# Patient Record
Sex: FEMALE | Race: WHITE | NOT HISPANIC OR LATINO
[De-identification: names, ages, dates, MRNs, and addresses within clinical notes are randomized per-mention and may not be internally consistent; named-entity substitution may affect disease eponyms.]

---

## 2017-10-18 ENCOUNTER — APPOINTMENT (OUTPATIENT)
Dept: BARIATRICS | Facility: CLINIC | Age: 32
End: 2017-10-18
Payer: MEDICAID

## 2017-10-18 ENCOUNTER — OUTPATIENT (OUTPATIENT)
Dept: OUTPATIENT SERVICES | Facility: HOSPITAL | Age: 32
LOS: 1 days | End: 2017-10-18
Payer: MEDICAID

## 2017-10-18 VITALS
SYSTOLIC BLOOD PRESSURE: 116 MMHG | BODY MASS INDEX: 48.89 KG/M2 | DIASTOLIC BLOOD PRESSURE: 82 MMHG | WEIGHT: 272.49 LBS | HEIGHT: 62.5 IN

## 2017-10-18 DIAGNOSIS — Z98.84 BARIATRIC SURGERY STATUS: ICD-10-CM

## 2017-10-18 PROCEDURE — S2083 ADJUSTMENT GASTRIC BAND: CPT

## 2017-10-18 PROCEDURE — 74220 X-RAY XM ESOPHAGUS 1CNTRST: CPT

## 2017-10-18 PROCEDURE — 99214 OFFICE O/P EST MOD 30 MIN: CPT | Mod: 25

## 2017-10-18 PROCEDURE — 74220 X-RAY XM ESOPHAGUS 1CNTRST: CPT | Mod: 26

## 2017-10-18 RX ORDER — CETIRIZINE HYDROCHLORIDE 10 MG/1
10 TABLET, FILM COATED ORAL
Refills: 0 | Status: ACTIVE | COMMUNITY

## 2017-12-01 ENCOUNTER — APPOINTMENT (OUTPATIENT)
Dept: BARIATRICS/WEIGHT MGMT | Facility: CLINIC | Age: 32
End: 2017-12-01

## 2018-03-23 ENCOUNTER — RESULT REVIEW (OUTPATIENT)
Age: 33
End: 2018-03-23

## 2019-01-31 ENCOUNTER — APPOINTMENT (OUTPATIENT)
Dept: BARIATRICS | Facility: CLINIC | Age: 34
End: 2019-01-31
Payer: MEDICAID

## 2019-01-31 ENCOUNTER — OUTPATIENT (OUTPATIENT)
Dept: OUTPATIENT SERVICES | Facility: HOSPITAL | Age: 34
LOS: 1 days | End: 2019-01-31
Payer: MEDICAID

## 2019-01-31 VITALS
HEART RATE: 83 BPM | OXYGEN SATURATION: 98 % | BODY MASS INDEX: 41.53 KG/M2 | WEIGHT: 231.48 LBS | DIASTOLIC BLOOD PRESSURE: 80 MMHG | HEIGHT: 62.5 IN | SYSTOLIC BLOOD PRESSURE: 122 MMHG

## 2019-01-31 VITALS — TEMPERATURE: 97.8 F

## 2019-01-31 DIAGNOSIS — K21.9 GASTRO-ESOPHAGEAL REFLUX DISEASE W/OUT ESOPHAGITIS: ICD-10-CM

## 2019-01-31 DIAGNOSIS — E66.01 MORBID (SEVERE) OBESITY DUE TO EXCESS CALORIES: ICD-10-CM

## 2019-01-31 DIAGNOSIS — Z98.84 BARIATRIC SURGERY STATUS: ICD-10-CM

## 2019-01-31 PROCEDURE — 74220 X-RAY XM ESOPHAGUS 1CNTRST: CPT | Mod: 26

## 2019-01-31 PROCEDURE — 74220 X-RAY XM ESOPHAGUS 1CNTRST: CPT

## 2019-01-31 PROCEDURE — 99214 OFFICE O/P EST MOD 30 MIN: CPT

## 2019-01-31 NOTE — REASON FOR VISIT
[Follow-Up Visit] : a follow-up visit for [S/P Bariatric Surgery] : s/p bariatric surgery [Band Adjustment] : band adjustment

## 2019-02-04 NOTE — PHYSICAL EXAM
[Obese, well nourished, in no acute distress] : obese, well nourished, in no acute distress [Normal] : affect appropriate [de-identified] : normoactive bowel sounds, soft and non tender, no hepatosplenomegaly or masses appreciated. + tenderness /swelling / erythema / clear discharge along umbilicus. [de-identified] : + erythema / swelling- jaden umbilical region.

## 2019-02-04 NOTE — ASSESSMENT
[FreeTextEntry1] : 33 year old F s/p lap band surgery APS- here for follow up visit. Weight loss  since last visit.Currently lap band is empty. Patient reports with complaints of pain / swelling along umbilicus -- on an antibiotic regimen due to history of recurrent cellulitis.Currently lap band is empty. Cellulitis - umbilicus. Pt is afebrile. Denies chills. \par \par VE PERFORMED TODAY- NO OBSTRUCTION. NO PROLAPSE. MILD POUCH DILATATION.\par \par No Lap Band adjustment performed today.\par \par Nutritional counseling has been provided. The patient is encouraged to remain calorie conscious and continue a low fat, low carbohydrate, protein focus diet. Pt encouraged to participate in a daily exercise regimen incorporating cardio and  strength training. Encouraged to keep a food journal.\par \par \par Dr. Anne saw patient. Discussed and reviewed risks and benefits of removal of lap band and port. \par \par \par Plan to schedule surgery for removal of lap band and port secondary to reflux symptoms and occasional stuck episodes. \par Wound culture sent to pharmacy for culture and sensitivity. \par \par Spoke to Dr. Noe - infectious disease - recommended pt continue doxycycline 100 mg bid x 10 days until Culture comes back within 72 hours. \par \par Plan to be referred to obesity medicine specialist within next 1-2 months. \par \par Return to office  if any concerns or worsening of symptoms.  \par

## 2019-02-04 NOTE — HISTORY OF PRESENT ILLNESS
[Procedure: ___] : Procedure performed: [unfilled]  [Date of Surgery: ___] : Date of Surgery:   [unfilled] [Surgeon Name:   ___] : Surgeon Name: Dr. SKINNER [Pre-Op Weight ___] : Pre-op weight was [unfilled] lbs [de-identified] : 33 year old F s/p lap band surgery APS- here for follow up visit. Weight loss  since last visit.Currently lap band is empty. Patient reports with complaints of pain / swelling along umbilicus -- on an antibiotic regimen due to history of recurrent cellulitis. ( doxycycline 100 mg po bid x 10 days).Currently following weight watchers program.Pt states she is is consuming consistent protein focus meals and consuming a sufficient amount of zero calorie liquid per day.No complaints of nausea or vomiting. Pt with complaints of mild to moderate reflux symptoms. No change in BM. Denies port site pain. Exercising regularly incorporating cardio and strength training. Swab sent to lab for culture and sensitivity.\par Recent CT scan abdomen - 2018 Normal denies abscess - plan to forward results.

## 2019-02-04 NOTE — REVIEW OF SYSTEMS
[Recent Change In Weight] : ~T recent weight change [Dysphagia] : dysphagia [Abdominal Pain] : abdominal pain [Reflux/Heartburn] : reflux/heartburn [Negative] : Endocrine [Fever] : no fever [Leg Claudication] : no intermittent leg claudication [Lower Ext Edema] : no lower extremity edema [Cough] : no cough [SOB on Exertion] : no shortness of breath during exertion [Vomiting] : no vomiting [Constipation] : no constipation [Diarrhea] : no diarrhea [FreeTextEntry2] : weight loss  [FreeTextEntry4] : occasional with solid foods.  [FreeTextEntry7] : jaden umbilical pain discomfort   - occasional port site pain. daytime/ nighttime reflux symptoms.

## 2019-02-08 LAB — BACTERIA SPEC CULT: ABNORMAL

## 2019-03-14 ENCOUNTER — OUTPATIENT (OUTPATIENT)
Dept: OUTPATIENT SERVICES | Facility: HOSPITAL | Age: 34
LOS: 1 days | End: 2019-03-14
Payer: MEDICAID

## 2019-03-14 VITALS
DIASTOLIC BLOOD PRESSURE: 85 MMHG | HEIGHT: 63 IN | OXYGEN SATURATION: 98 % | RESPIRATION RATE: 14 BRPM | SYSTOLIC BLOOD PRESSURE: 131 MMHG | WEIGHT: 104.5 LBS | TEMPERATURE: 98 F | HEART RATE: 74 BPM

## 2019-03-14 DIAGNOSIS — K21.9 GASTRO-ESOPHAGEAL REFLUX DISEASE WITHOUT ESOPHAGITIS: ICD-10-CM

## 2019-03-14 DIAGNOSIS — Z98.84 BARIATRIC SURGERY STATUS: Chronic | ICD-10-CM

## 2019-03-14 DIAGNOSIS — R13.10 DYSPHAGIA, UNSPECIFIED: ICD-10-CM

## 2019-03-14 DIAGNOSIS — K95.09 OTHER COMPLICATIONS OF GASTRIC BAND PROCEDURE: ICD-10-CM

## 2019-03-14 DIAGNOSIS — Z01.818 ENCOUNTER FOR OTHER PREPROCEDURAL EXAMINATION: ICD-10-CM

## 2019-03-14 DIAGNOSIS — Z98.84 BARIATRIC SURGERY STATUS: ICD-10-CM

## 2019-03-14 LAB
ANION GAP SERPL CALC-SCNC: 5 MMOL/L — SIGNIFICANT CHANGE UP (ref 5–17)
BLD GP AB SCN SERPL QL: SIGNIFICANT CHANGE UP
BUN SERPL-MCNC: 14 MG/DL — SIGNIFICANT CHANGE UP (ref 7–23)
CALCIUM SERPL-MCNC: 9.2 MG/DL — SIGNIFICANT CHANGE UP (ref 8.4–10.5)
CHLORIDE SERPL-SCNC: 102 MMOL/L — SIGNIFICANT CHANGE UP (ref 96–108)
CO2 SERPL-SCNC: 29 MMOL/L — SIGNIFICANT CHANGE UP (ref 22–31)
CREAT SERPL-MCNC: 0.67 MG/DL — SIGNIFICANT CHANGE UP (ref 0.5–1.3)
GLUCOSE SERPL-MCNC: 91 MG/DL — SIGNIFICANT CHANGE UP (ref 70–99)
HCT VFR BLD CALC: 39 % — SIGNIFICANT CHANGE UP (ref 34.5–45)
HGB BLD-MCNC: 12.7 G/DL — SIGNIFICANT CHANGE UP (ref 11.5–15.5)
MCHC RBC-ENTMCNC: 27.9 PG — SIGNIFICANT CHANGE UP (ref 27–34)
MCHC RBC-ENTMCNC: 32.6 GM/DL — SIGNIFICANT CHANGE UP (ref 32–36)
MCV RBC AUTO: 85.5 FL — SIGNIFICANT CHANGE UP (ref 80–100)
NRBC # BLD: 0 /100 WBCS — SIGNIFICANT CHANGE UP (ref 0–0)
PLATELET # BLD AUTO: 212 K/UL — SIGNIFICANT CHANGE UP (ref 150–400)
POTASSIUM SERPL-MCNC: 4.6 MMOL/L — SIGNIFICANT CHANGE UP (ref 3.5–5.3)
POTASSIUM SERPL-SCNC: 4.6 MMOL/L — SIGNIFICANT CHANGE UP (ref 3.5–5.3)
RBC # BLD: 4.56 M/UL — SIGNIFICANT CHANGE UP (ref 3.8–5.2)
RBC # FLD: 12.9 % — SIGNIFICANT CHANGE UP (ref 10.3–14.5)
SODIUM SERPL-SCNC: 136 MMOL/L — SIGNIFICANT CHANGE UP (ref 135–145)
WBC # BLD: 7.02 K/UL — SIGNIFICANT CHANGE UP (ref 3.8–10.5)
WBC # FLD AUTO: 7.02 K/UL — SIGNIFICANT CHANGE UP (ref 3.8–10.5)

## 2019-03-14 NOTE — H&P PST ADULT - NSICDXPASTSURGICALHX_GEN_ALL_CORE_FT
PAST SURGICAL HISTORY:  H/O laparoscopic adjustable gastric banding 2012    History of wisdom tooth extraction 2008

## 2019-03-14 NOTE — H&P PST ADULT - NSICDXPROBLEM_GEN_ALL_CORE_FT
PROBLEM DIAGNOSES  Problem: Gastric band malfunction  Assessment and Plan: laproscopic removal of band   medical clearance  pre op instruction  will repeat UCG on 4/8/19. pt agreed to come to [facility] for pregnency test

## 2019-03-14 NOTE — H&P PST ADULT - NSICDXFAMILYHX_GEN_ALL_CORE_FT
FAMILY HISTORY:  Family history of ovarian cancer, mother  Family history of prostate cancer in father

## 2019-03-14 NOTE — H&P PST ADULT - NSICDXPASTMEDICALHX_GEN_ALL_CORE_FT
PAST MEDICAL HISTORY:  Closed fracture of wrist left wrist at age 6    Fracture of clavicle at age 4.    No Past Medical History

## 2019-03-14 NOTE — H&P PST ADULT - HISTORY OF PRESENT ILLNESS
33year old female who had undergone lap band placement on 2012 presents with c/o lap band port site pain, vomiting and multiple episodes of food stuck . Schedule for lap band removal.

## 2019-04-04 NOTE — ASU DISCHARGE PLAN (ADULT/PEDIATRIC) - CARE PROVIDER_API CALL
Tasia Anne (MD)  Surgery  [address] [place] [address]  Phone: [phone]  Fax: [phone]  Follow Up Time: 1 week

## 2019-04-04 NOTE — ASU DISCHARGE PLAN (ADULT/PEDIATRIC) - SPECIFY DIET AND FLUID
advance as tolerated SLOWLY from clear liquids to full fluids to small portions of low-calorie foods. Generally you will stay on each level for 1-2 days. If you experience nausea/vomiting go back to the previous level you tolerated for another 24-48 hours , then try advancing again.

## 2019-04-04 NOTE — ASU DISCHARGE PLAN (ADULT/PEDIATRIC) - ASU DC SPECIAL INSTRUCTIONSFT
Ice packs to abdomen and shoulder  Pain meds as per MD  Take an over the counter stool softener like Colace to avoid constipation while taking a narcotic for pain Call Dr. Anne's office at [phone] to make an appointment to be seen within 7- 10 days  Ice packs to abdomen and shoulder  Pain meds as per MD  Take an over the counter stool softener like Colace to avoid constipation while taking a narcotic for pain

## 2019-04-04 NOTE — ASU DISCHARGE PLAN (ADULT/PEDIATRIC) - CALL YOUR DOCTOR IF YOU HAVE ANY OF THE FOLLOWING:
Pain not relieved by Medications/Bleeding that does not stop/Swelling that gets worse/Fever greater than (need to indicate Fahrenheit or Celsius)

## 2019-04-04 NOTE — ASU DISCHARGE PLAN (ADULT/PEDIATRIC) - FOLLOW UP APPOINTMENTS
911 or go to the nearest Emergency Room/[facility]

## 2019-04-08 ENCOUNTER — APPOINTMENT (OUTPATIENT)
Dept: SURGERY | Facility: CLINIC | Age: 34
End: 2019-04-08
Payer: MEDICAID

## 2019-04-08 ENCOUNTER — TRANSCRIPTION ENCOUNTER (OUTPATIENT)
Age: 34
End: 2019-04-08

## 2019-04-08 VITALS
HEART RATE: 75 BPM | DIASTOLIC BLOOD PRESSURE: 70 MMHG | WEIGHT: 223.1 LBS | BODY MASS INDEX: 40.03 KG/M2 | OXYGEN SATURATION: 98 % | HEIGHT: 62.5 IN | SYSTOLIC BLOOD PRESSURE: 110 MMHG

## 2019-04-08 LAB — HCG UR QL: NEGATIVE — SIGNIFICANT CHANGE UP

## 2019-04-08 PROCEDURE — G0463: CPT

## 2019-04-08 PROCEDURE — 81025 URINE PREGNANCY TEST: CPT

## 2019-04-08 PROCEDURE — 86900 BLOOD TYPING SEROLOGIC ABO: CPT

## 2019-04-08 PROCEDURE — 99214 OFFICE O/P EST MOD 30 MIN: CPT | Mod: 24

## 2019-04-08 PROCEDURE — 86850 RBC ANTIBODY SCREEN: CPT

## 2019-04-08 PROCEDURE — 36415 COLL VENOUS BLD VENIPUNCTURE: CPT

## 2019-04-08 PROCEDURE — 80048 BASIC METABOLIC PNL TOTAL CA: CPT

## 2019-04-08 PROCEDURE — 86901 BLOOD TYPING SEROLOGIC RH(D): CPT

## 2019-04-08 PROCEDURE — 85027 COMPLETE CBC AUTOMATED: CPT

## 2019-04-08 NOTE — PLAN
[FreeTextEntry1] : Recurrent omphalitis or umbilical cellulitis for ~1+ years.\par Fortunately, by history, no obviously associated systemic or GI or  complaints to suggest fistulas or intra-abdominal processes.\par Previous CT scan now dated, but generally reassuring.\par Careful exam today reveals large volume of IMPACTED hair DEEP inside umbilicus.  Successfully extracted and tolerated well.\par \par As such, patient cleared for Lap Band removal from General Surgery perspective.\par Plan to wash site with warm soap and water daily.\par H2O2 rinse to follow.\par Will follow-up with me after Lap Band removal.\par This should be amenable to conservative management and local care.\par However, should issues persist, then interval imaging seems appropriate prior to any local exploration for more definitive care.\par \par Patient and mother pleased.  They leave office in good spirits and verbalize plan as outlined above.

## 2019-04-08 NOTE — REASON FOR VISIT
[Consultation] : a consultation visit [Parent] : parent [FreeTextEntry1] : Recurrent umbilical cellulitis.

## 2019-04-08 NOTE — PHYSICAL EXAM
[Respiratory Effort] : normal respiratory effort [Normal Rate and Rhythm] : normal rate and rhythm [Abdominal Masses] : No abdominal masses [No HSM] : no hepatosplenomegaly [Tender] : was nontender [Enlarged] : not enlarged [Alert] : alert [Oriented to Person] : oriented to person [Oriented to Place] : oriented to place [Oriented to Time] : oriented to time [Anxious] : anxious [de-identified] : Appears well, no acute distress, ambulates easily into office and assumes examination table without need of assistance. [de-identified] : Normocephalic and atraumatic. [de-identified] : Supple with full range of motion. [de-identified] : Deferred. [de-identified] : Full, obese, but soft and non tense and non tender\par Port site WNL.\par Umbilicus with serous drainage, but no odor, cellulitis or palpable collection presently. [de-identified] : Deferred. [de-identified] : Deferred. [de-identified] : Grossly symmetric and within normal limits without any obvious motor or sensory deficits. [de-identified] : See abdomen. [de-identified] : but appropriately so...

## 2019-04-08 NOTE — REVIEW OF SYSTEMS
[Anxiety] : anxiety [Negative] : Heme/Lymph [FreeTextEntry7] : Dysphagia with poor mastication, somewhat improved with aspiration of Lap Band. [de-identified] : See HPI. [de-identified] : but appropriately so, no acute distress...

## 2019-04-08 NOTE — HISTORY OF PRESENT ILLNESS
[de-identified] : Patient with history of recurrent infection/ "cellulitis" around or at umbilicus over the last year.\par Treated with intermittent oral antibiotics.\par Patient frequently notes odor and drainage prior to improvement.\par CT scan already done.\par Denies any obviously associated systemic complaints today or ongoing related GI or  issues, though she is preparing for removal of her Lap Band.

## 2019-04-08 NOTE — DATA REVIEWED
[FreeTextEntry1] : CT scan of 1/29/2018 reviewed (report and images) and discussed with patient and family in detail.

## 2019-04-09 ENCOUNTER — OUTPATIENT (OUTPATIENT)
Dept: OUTPATIENT SERVICES | Facility: HOSPITAL | Age: 34
LOS: 1 days | End: 2019-04-09
Payer: MEDICAID

## 2019-04-09 ENCOUNTER — RESULT REVIEW (OUTPATIENT)
Age: 34
End: 2019-04-09

## 2019-04-09 ENCOUNTER — APPOINTMENT (OUTPATIENT)
Dept: BARIATRICS | Facility: HOSPITAL | Age: 34
End: 2019-04-09
Payer: MEDICAID

## 2019-04-09 VITALS
RESPIRATION RATE: 14 BRPM | DIASTOLIC BLOOD PRESSURE: 71 MMHG | TEMPERATURE: 98 F | HEIGHT: 63 IN | SYSTOLIC BLOOD PRESSURE: 119 MMHG | HEART RATE: 74 BPM | OXYGEN SATURATION: 96 % | WEIGHT: 224.65 LBS

## 2019-04-09 VITALS
OXYGEN SATURATION: 95 % | SYSTOLIC BLOOD PRESSURE: 115 MMHG | HEART RATE: 85 BPM | RESPIRATION RATE: 17 BRPM | DIASTOLIC BLOOD PRESSURE: 75 MMHG

## 2019-04-09 DIAGNOSIS — K21.9 GASTRO-ESOPHAGEAL REFLUX DISEASE WITHOUT ESOPHAGITIS: ICD-10-CM

## 2019-04-09 DIAGNOSIS — Z98.84 BARIATRIC SURGERY STATUS: ICD-10-CM

## 2019-04-09 DIAGNOSIS — R13.10 DYSPHAGIA, UNSPECIFIED: ICD-10-CM

## 2019-04-09 DIAGNOSIS — Z98.84 BARIATRIC SURGERY STATUS: Chronic | ICD-10-CM

## 2019-04-09 LAB — ABO RH CONFIRMATION: SIGNIFICANT CHANGE UP

## 2019-04-09 PROCEDURE — 43774 LAP RMVL GASTR ADJ ALL PARTS: CPT | Mod: AS

## 2019-04-09 PROCEDURE — 43774 LAP RMVL GASTR ADJ ALL PARTS: CPT

## 2019-04-09 PROCEDURE — 88300 SURGICAL PATH GROSS: CPT

## 2019-04-09 PROCEDURE — 36415 COLL VENOUS BLD VENIPUNCTURE: CPT

## 2019-04-09 PROCEDURE — 88300 SURGICAL PATH GROSS: CPT | Mod: 26

## 2019-04-09 RX ORDER — CEFAZOLIN SODIUM 1 G
2000 VIAL (EA) INJECTION ONCE
Qty: 0 | Refills: 0 | Status: COMPLETED | OUTPATIENT
Start: 2019-04-09 | End: 2019-04-09

## 2019-04-09 RX ORDER — ENOXAPARIN SODIUM 100 MG/ML
40 INJECTION SUBCUTANEOUS ONCE
Qty: 0 | Refills: 0 | Status: COMPLETED | OUTPATIENT
Start: 2019-04-09 | End: 2019-04-09

## 2019-04-09 RX ORDER — APREPITANT 80 MG/1
40 CAPSULE ORAL ONCE
Qty: 0 | Refills: 0 | Status: COMPLETED | OUTPATIENT
Start: 2019-04-09 | End: 2019-04-09

## 2019-04-09 RX ORDER — CHLORHEXIDINE GLUCONATE 213 G/1000ML
1 SOLUTION TOPICAL ONCE
Qty: 0 | Refills: 0 | Status: COMPLETED | OUTPATIENT
Start: 2019-04-09 | End: 2019-04-09

## 2019-04-09 RX ORDER — OXYCODONE AND ACETAMINOPHEN 5; 325 MG/1; MG/1
1 TABLET ORAL
Qty: 12 | Refills: 0
Start: 2019-04-09 | End: 2019-04-11

## 2019-04-09 RX ORDER — SODIUM CHLORIDE 9 MG/ML
1000 INJECTION, SOLUTION INTRAVENOUS
Qty: 0 | Refills: 0 | Status: DISCONTINUED | OUTPATIENT
Start: 2019-04-09 | End: 2019-04-09

## 2019-04-09 RX ORDER — ONDANSETRON 8 MG/1
4 TABLET, FILM COATED ORAL ONCE
Qty: 0 | Refills: 0 | Status: DISCONTINUED | OUTPATIENT
Start: 2019-04-09 | End: 2019-04-10

## 2019-04-09 RX ORDER — ACETAMINOPHEN 500 MG
700 TABLET ORAL ONCE
Qty: 0 | Refills: 0 | Status: DISCONTINUED | OUTPATIENT
Start: 2019-04-09 | End: 2019-04-09

## 2019-04-09 RX ORDER — HYDROMORPHONE HYDROCHLORIDE 2 MG/ML
0.5 INJECTION INTRAMUSCULAR; INTRAVENOUS; SUBCUTANEOUS
Qty: 0 | Refills: 0 | Status: DISCONTINUED | OUTPATIENT
Start: 2019-04-09 | End: 2019-04-10

## 2019-04-09 RX ORDER — OXYCODONE HYDROCHLORIDE 5 MG/1
5 TABLET ORAL ONCE
Qty: 0 | Refills: 0 | Status: DISCONTINUED | OUTPATIENT
Start: 2019-04-09 | End: 2019-04-10

## 2019-04-09 RX ORDER — SODIUM CHLORIDE 9 MG/ML
1000 INJECTION, SOLUTION INTRAVENOUS
Qty: 0 | Refills: 0 | Status: DISCONTINUED | OUTPATIENT
Start: 2019-04-09 | End: 2019-04-10

## 2019-04-09 RX ADMIN — SODIUM CHLORIDE 1000 MILLILITER(S): 9 INJECTION, SOLUTION INTRAVENOUS at 12:16

## 2019-04-09 RX ADMIN — ENOXAPARIN SODIUM 40 MILLIGRAM(S): 100 INJECTION SUBCUTANEOUS at 12:16

## 2019-04-09 RX ADMIN — APREPITANT 40 MILLIGRAM(S): 80 CAPSULE ORAL at 10:24

## 2019-04-09 RX ADMIN — CHLORHEXIDINE GLUCONATE 1 APPLICATION(S): 213 SOLUTION TOPICAL at 12:16

## 2019-04-09 NOTE — BRIEF OPERATIVE NOTE - NSICDXBRIEFPOSTOP_GEN_ALL_CORE_FT
POST-OP DIAGNOSIS:  Bariatric surgery status 09-Apr-2019 15:16:02  Melissa Canas  Dysphagia 09-Apr-2019 15:15:52  Melissa Canas  Gastro-esophageal reflux disease without esophagitis 09-Apr-2019 15:15:38  Melissa Canas

## 2019-04-09 NOTE — BRIEF OPERATIVE NOTE - NSICDXBRIEFPREOP_GEN_ALL_CORE_FT
PRE-OP DIAGNOSIS:  Bariatric surgery status 09-Apr-2019 15:15:13  Melissa Canas  Dysphagia 09-Apr-2019 15:14:59  Melissa Canas  Gastro-esophageal reflux disease without esophagitis 09-Apr-2019 15:14:37  Melissa Canas

## 2019-04-10 ENCOUNTER — MESSAGE (OUTPATIENT)
Age: 34
End: 2019-04-10

## 2019-04-11 LAB — SURGICAL PATHOLOGY STUDY: SIGNIFICANT CHANGE UP

## 2019-04-16 ENCOUNTER — APPOINTMENT (OUTPATIENT)
Dept: BARIATRICS | Facility: CLINIC | Age: 34
End: 2019-04-16
Payer: MEDICAID

## 2019-04-16 VITALS
SYSTOLIC BLOOD PRESSURE: 94 MMHG | HEIGHT: 62.5 IN | HEART RATE: 93 BPM | OXYGEN SATURATION: 98 % | WEIGHT: 221.34 LBS | DIASTOLIC BLOOD PRESSURE: 78 MMHG | BODY MASS INDEX: 39.71 KG/M2

## 2019-04-16 DIAGNOSIS — Z87.19 PERSONAL HISTORY OF OTHER DISEASES OF THE DIGESTIVE SYSTEM: ICD-10-CM

## 2019-04-16 DIAGNOSIS — Z98.84 BARIATRIC SURGERY STATUS: ICD-10-CM

## 2019-04-16 PROCEDURE — 99024 POSTOP FOLLOW-UP VISIT: CPT

## 2019-04-16 RX ORDER — AMOXICILLIN AND CLAVULANATE POTASSIUM 875; 125 MG/1; MG/1
875-125 TABLET, COATED ORAL
Qty: 20 | Refills: 0 | Status: COMPLETED | COMMUNITY
Start: 2018-11-29 | End: 2019-04-16

## 2019-04-16 RX ORDER — DOXYCYCLINE HYCLATE 100 MG/1
100 CAPSULE ORAL
Qty: 28 | Refills: 0 | Status: COMPLETED | COMMUNITY
Start: 2019-01-30 | End: 2019-04-16

## 2019-04-16 RX ORDER — DOXYCYCLINE 40 MG/1
CAPSULE ORAL
Refills: 0 | Status: COMPLETED | COMMUNITY
End: 2019-04-16

## 2019-04-16 RX ORDER — CEFADROXIL 500 MG/1
500 CAPSULE ORAL
Qty: 20 | Refills: 0 | Status: COMPLETED | COMMUNITY
Start: 2019-01-27 | End: 2019-04-16

## 2019-04-16 RX ORDER — MUPIROCIN 20 MG/G
2 OINTMENT TOPICAL
Qty: 22 | Refills: 0 | Status: COMPLETED | COMMUNITY
Start: 2019-01-30 | End: 2019-04-16

## 2019-04-18 PROBLEM — Z87.19 HISTORY OF DYSPHAGIA: Status: RESOLVED | Noted: 2017-10-23 | Resolved: 2019-04-18

## 2019-04-18 PROBLEM — Z98.84 LAP-BAND SURGERY STATUS: Status: RESOLVED | Noted: 2017-10-17 | Resolved: 2019-04-18

## 2019-04-22 NOTE — HISTORY OF PRESENT ILLNESS
[Procedure: ___] : Procedure performed: [unfilled]  [Date of Surgery: ___] : Date of Surgery:   [unfilled] [Surgeon Name:   ___] : Surgeon Name: Dr. SKINNER [de-identified] : 34 year female one week s/p laparoscopic removal of lap-band and port secondary to reflux symptoms and dysphagia presents today for first post operative visit. Patient lost weight since last visit. She is tolerating foods and is drinking adequate water daily.  Patient denies shortness of breath, calf pain, nausea, vomiting, diarrhea and constipation. She is taking stool softener to prevent constipation. Patient is ambulating frequently for exercise.  [de-identified] : LAP BAND SURGERY APS , Date of Surgery: 4/4/2012, Surgeon Name: Dr. PINO . Pre-op weight was 258 lbs.

## 2019-04-22 NOTE — ASSESSMENT
[FreeTextEntry1] : 34 year old female one week s/p laparoscopic removal of lap-band and port secondary to reflux symptoms and dysphagia presents today for first post operative visit. She  is doing well and lost weight. Incisions are healing appropriately.  The patient was encouraged to continue a low fat, low carbohydrate and high protein diet.  Patient was advised to increase ambulation and not to do any heavy lifting until 6 weeks post op.\par \par Nutrition and exercise counseling provided.\par Follow up in 3-4 weeks\par Call with any questions or concerns

## 2019-04-22 NOTE — REVIEW OF SYSTEMS
[Recent Change In Weight] : ~T recent weight change [Negative] : Endocrine [Fever] : no fever [Chills] : no chills [Night Sweats] : no night sweats [Fatigue] : no fatigue [Dysphagia] : no dysphagia [Hoarseness] : no hoarseness [Odynophagia] : no odynophagia [FreeTextEntry2] : Weight loss

## 2019-05-14 ENCOUNTER — APPOINTMENT (OUTPATIENT)
Dept: BARIATRICS | Facility: CLINIC | Age: 34
End: 2019-05-14
Payer: MEDICAID

## 2019-05-14 VITALS
WEIGHT: 222 LBS | BODY MASS INDEX: 39.83 KG/M2 | OXYGEN SATURATION: 98 % | HEIGHT: 62.5 IN | SYSTOLIC BLOOD PRESSURE: 100 MMHG | HEART RATE: 81 BPM | DIASTOLIC BLOOD PRESSURE: 70 MMHG

## 2019-05-14 DIAGNOSIS — Z98.84 BARIATRIC SURGERY STATUS: ICD-10-CM

## 2019-05-14 PROCEDURE — 99024 POSTOP FOLLOW-UP VISIT: CPT

## 2019-05-14 NOTE — REASON FOR VISIT
[Follow-Up Visit] : a follow-up visit for [Morbid Obesity (BMI<40)] : morbid obesity (bmi<40) [S/P Bariatric Surgery] : s/p bariatric surgery [Other___] : [unfilled]

## 2019-05-15 NOTE — HISTORY OF PRESENT ILLNESS
[Procedure: ___] : Procedure performed: [unfilled]  [Date of Surgery: ___] : Date of Surgery:   [unfilled] [Surgeon Name:   ___] : Surgeon Name: Dr. SKINNER [de-identified] : 35 yo F  s/p laparoscopic removal of lap-band and port secondary to reflux symptoms and dysphagia here for follow up  visit. Weight loss since last visit. Denies any food intolerances. Pt is consuming consistent protein focus meals and consuming a sufficient amount of zero calorie liquid per day.Consuming 3 meals per day. No change in BM. No reflux symptoms. Exercising regularly incorporating cardio and strength training. Patient will consider following up with weight management/ nutritionist.  [de-identified] : LAP BAND SURGERY APS , Date of Surgery: 4/4/2012, Surgeon Name: Dr. PINO . Pre-op weight was 258 lbs.

## 2019-05-15 NOTE — ASSESSMENT
[FreeTextEntry1] : 33 yo F  s/p laparoscopic removal of lap-band and port secondary to reflux symptoms and dysphagia here for follow up  visit. Weight loss since last visit. Denies any food intolerances. \par \par \par Nutrition and exercise counseling provided.\par Will consider following up with weight management/ nutritionist. \par Dr. Anne saw patient today. \par Follow up as needed if any issues or concerns. \par Call with any questions or concerns

## 2019-05-15 NOTE — REVIEW OF SYSTEMS
[Recent Change In Weight] : ~T recent weight change [Negative] : Endocrine [Fever] : no fever [Night Sweats] : no night sweats [Fatigue] : no fatigue [Chills] : no chills [Dysphagia] : no dysphagia [Hoarseness] : no hoarseness [Odynophagia] : no odynophagia [FreeTextEntry2] : Weight loss since last visit.

## 2019-11-12 ENCOUNTER — RESULT REVIEW (OUTPATIENT)
Age: 34
End: 2019-11-12

## 2022-12-16 ENCOUNTER — EMERGENCY (EMERGENCY)
Facility: HOSPITAL | Age: 37
LOS: 1 days | Discharge: HOME | End: 2022-12-16
Attending: EMERGENCY MEDICINE | Admitting: EMERGENCY MEDICINE
Payer: MEDICAID

## 2022-12-16 VITALS
HEART RATE: 90 BPM | WEIGHT: 197.31 LBS | RESPIRATION RATE: 18 BRPM | SYSTOLIC BLOOD PRESSURE: 167 MMHG | HEIGHT: 63 IN | DIASTOLIC BLOOD PRESSURE: 91 MMHG | TEMPERATURE: 98 F

## 2022-12-16 VITALS
DIASTOLIC BLOOD PRESSURE: 78 MMHG | HEART RATE: 77 BPM | OXYGEN SATURATION: 99 % | RESPIRATION RATE: 18 BRPM | TEMPERATURE: 98 F | SYSTOLIC BLOOD PRESSURE: 122 MMHG

## 2022-12-16 DIAGNOSIS — Z98.84 BARIATRIC SURGERY STATUS: Chronic | ICD-10-CM

## 2022-12-16 LAB
ALBUMIN SERPL ELPH-MCNC: 3.6 G/DL — SIGNIFICANT CHANGE UP (ref 3.3–5)
ALP SERPL-CCNC: 82 U/L — SIGNIFICANT CHANGE UP (ref 30–120)
ALT FLD-CCNC: 31 U/L DA — SIGNIFICANT CHANGE UP (ref 10–60)
ANION GAP SERPL CALC-SCNC: 10 MMOL/L — SIGNIFICANT CHANGE UP (ref 5–17)
APPEARANCE UR: CLEAR — SIGNIFICANT CHANGE UP
APTT BLD: 32.4 SEC — SIGNIFICANT CHANGE UP (ref 27.5–35.5)
AST SERPL-CCNC: 16 U/L — SIGNIFICANT CHANGE UP (ref 10–40)
BACTERIA # UR AUTO: NEGATIVE — SIGNIFICANT CHANGE UP
BASOPHILS # BLD AUTO: 0.02 K/UL — SIGNIFICANT CHANGE UP (ref 0–0.2)
BASOPHILS NFR BLD AUTO: 0.3 % — SIGNIFICANT CHANGE UP (ref 0–2)
BILIRUB SERPL-MCNC: 0.6 MG/DL — SIGNIFICANT CHANGE UP (ref 0.2–1.2)
BILIRUB UR-MCNC: NEGATIVE — SIGNIFICANT CHANGE UP
BLD GP AB SCN SERPL QL: SIGNIFICANT CHANGE UP
BUN SERPL-MCNC: 10 MG/DL — SIGNIFICANT CHANGE UP (ref 7–23)
CALCIUM SERPL-MCNC: 9.3 MG/DL — SIGNIFICANT CHANGE UP (ref 8.4–10.5)
CHLORIDE SERPL-SCNC: 102 MMOL/L — SIGNIFICANT CHANGE UP (ref 96–108)
CO2 SERPL-SCNC: 26 MMOL/L — SIGNIFICANT CHANGE UP (ref 22–31)
COLOR SPEC: YELLOW — SIGNIFICANT CHANGE UP
CREAT SERPL-MCNC: 0.77 MG/DL — SIGNIFICANT CHANGE UP (ref 0.5–1.3)
DIFF PNL FLD: NEGATIVE — SIGNIFICANT CHANGE UP
EGFR: 102 ML/MIN/1.73M2 — SIGNIFICANT CHANGE UP
EOSINOPHIL # BLD AUTO: 0.01 K/UL — SIGNIFICANT CHANGE UP (ref 0–0.5)
EOSINOPHIL NFR BLD AUTO: 0.1 % — SIGNIFICANT CHANGE UP (ref 0–6)
EPI CELLS # UR: SIGNIFICANT CHANGE UP
GLUCOSE SERPL-MCNC: 148 MG/DL — HIGH (ref 70–99)
GLUCOSE UR QL: 1000 MG/DL
HCG UR QL: NEGATIVE — SIGNIFICANT CHANGE UP
HCT VFR BLD CALC: 43.8 % — SIGNIFICANT CHANGE UP (ref 34.5–45)
HGB BLD-MCNC: 14.3 G/DL — SIGNIFICANT CHANGE UP (ref 11.5–15.5)
IMM GRANULOCYTES NFR BLD AUTO: 0.1 % — SIGNIFICANT CHANGE UP (ref 0–0.9)
INR BLD: 1.15 RATIO — SIGNIFICANT CHANGE UP (ref 0.88–1.16)
KETONES UR-MCNC: ABNORMAL
LACTATE SERPL-SCNC: 0.9 MMOL/L — SIGNIFICANT CHANGE UP (ref 0.7–2)
LEUKOCYTE ESTERASE UR-ACNC: NEGATIVE — SIGNIFICANT CHANGE UP
LYMPHOCYTES # BLD AUTO: 2.34 K/UL — SIGNIFICANT CHANGE UP (ref 1–3.3)
LYMPHOCYTES # BLD AUTO: 30.9 % — SIGNIFICANT CHANGE UP (ref 13–44)
MCHC RBC-ENTMCNC: 25.7 PG — LOW (ref 27–34)
MCHC RBC-ENTMCNC: 32.6 GM/DL — SIGNIFICANT CHANGE UP (ref 32–36)
MCV RBC AUTO: 78.8 FL — LOW (ref 80–100)
MONOCYTES # BLD AUTO: 0.39 K/UL — SIGNIFICANT CHANGE UP (ref 0–0.9)
MONOCYTES NFR BLD AUTO: 5.2 % — SIGNIFICANT CHANGE UP (ref 2–14)
NEUTROPHILS # BLD AUTO: 4.8 K/UL — SIGNIFICANT CHANGE UP (ref 1.8–7.4)
NEUTROPHILS NFR BLD AUTO: 63.4 % — SIGNIFICANT CHANGE UP (ref 43–77)
NITRITE UR-MCNC: NEGATIVE — SIGNIFICANT CHANGE UP
NRBC # BLD: 0 /100 WBCS — SIGNIFICANT CHANGE UP (ref 0–0)
PH UR: 5 — SIGNIFICANT CHANGE UP (ref 5–8)
PLATELET # BLD AUTO: 255 K/UL — SIGNIFICANT CHANGE UP (ref 150–400)
POTASSIUM SERPL-MCNC: 3.8 MMOL/L — SIGNIFICANT CHANGE UP (ref 3.5–5.3)
POTASSIUM SERPL-SCNC: 3.8 MMOL/L — SIGNIFICANT CHANGE UP (ref 3.5–5.3)
PROT SERPL-MCNC: 8.2 G/DL — SIGNIFICANT CHANGE UP (ref 6–8.3)
PROT UR-MCNC: 15 MG/DL
PROTHROM AB SERPL-ACNC: 13.6 SEC — HIGH (ref 10.5–13.4)
RBC # BLD: 5.56 M/UL — HIGH (ref 3.8–5.2)
RBC # FLD: 13.2 % — SIGNIFICANT CHANGE UP (ref 10.3–14.5)
RBC CASTS # UR COMP ASSIST: NEGATIVE /HPF — SIGNIFICANT CHANGE UP (ref 0–4)
SARS-COV-2 RNA SPEC QL NAA+PROBE: SIGNIFICANT CHANGE UP
SODIUM SERPL-SCNC: 138 MMOL/L — SIGNIFICANT CHANGE UP (ref 135–145)
SP GR SPEC: 1.01 — SIGNIFICANT CHANGE UP (ref 1.01–1.02)
UROBILINOGEN FLD QL: NEGATIVE MG/DL — SIGNIFICANT CHANGE UP
WBC # BLD: 7.57 K/UL — SIGNIFICANT CHANGE UP (ref 3.8–10.5)
WBC # FLD AUTO: 7.57 K/UL — SIGNIFICANT CHANGE UP (ref 3.8–10.5)
WBC UR QL: NEGATIVE — SIGNIFICANT CHANGE UP

## 2022-12-16 PROCEDURE — 10140 I&D HMTMA SEROMA/FLUID COLLJ: CPT

## 2022-12-16 PROCEDURE — 86850 RBC ANTIBODY SCREEN: CPT

## 2022-12-16 PROCEDURE — 85730 THROMBOPLASTIN TIME PARTIAL: CPT

## 2022-12-16 PROCEDURE — 85610 PROTHROMBIN TIME: CPT

## 2022-12-16 PROCEDURE — 93010 ELECTROCARDIOGRAM REPORT: CPT

## 2022-12-16 PROCEDURE — 96368 THER/DIAG CONCURRENT INF: CPT | Mod: XU

## 2022-12-16 PROCEDURE — 99204 OFFICE O/P NEW MOD 45 MIN: CPT | Mod: 25

## 2022-12-16 PROCEDURE — 96375 TX/PRO/DX INJ NEW DRUG ADDON: CPT | Mod: XU

## 2022-12-16 PROCEDURE — 80053 COMPREHEN METABOLIC PANEL: CPT

## 2022-12-16 PROCEDURE — 96365 THER/PROPH/DIAG IV INF INIT: CPT | Mod: XU

## 2022-12-16 PROCEDURE — 93005 ELECTROCARDIOGRAM TRACING: CPT

## 2022-12-16 PROCEDURE — 87070 CULTURE OTHR SPECIMN AEROBIC: CPT

## 2022-12-16 PROCEDURE — 86901 BLOOD TYPING SEROLOGIC RH(D): CPT

## 2022-12-16 PROCEDURE — 99285 EMERGENCY DEPT VISIT HI MDM: CPT | Mod: 25

## 2022-12-16 PROCEDURE — 81025 URINE PREGNANCY TEST: CPT

## 2022-12-16 PROCEDURE — 99285 EMERGENCY DEPT VISIT HI MDM: CPT

## 2022-12-16 PROCEDURE — 10061 I&D ABSCESS COMP/MULTIPLE: CPT

## 2022-12-16 PROCEDURE — 87635 SARS-COV-2 COVID-19 AMP PRB: CPT

## 2022-12-16 PROCEDURE — 81001 URINALYSIS AUTO W/SCOPE: CPT

## 2022-12-16 PROCEDURE — 83605 ASSAY OF LACTIC ACID: CPT

## 2022-12-16 PROCEDURE — 87040 BLOOD CULTURE FOR BACTERIA: CPT

## 2022-12-16 PROCEDURE — 85025 COMPLETE CBC W/AUTO DIFF WBC: CPT

## 2022-12-16 PROCEDURE — 86900 BLOOD TYPING SEROLOGIC ABO: CPT

## 2022-12-16 PROCEDURE — 74177 CT ABD & PELVIS W/CONTRAST: CPT | Mod: 26,MA

## 2022-12-16 PROCEDURE — 74177 CT ABD & PELVIS W/CONTRAST: CPT | Mod: MA

## 2022-12-16 PROCEDURE — 36415 COLL VENOUS BLD VENIPUNCTURE: CPT

## 2022-12-16 RX ORDER — CEPHALEXIN 500 MG
1 CAPSULE ORAL
Qty: 40 | Refills: 0
Start: 2022-12-16 | End: 2022-12-25

## 2022-12-16 RX ORDER — OXYCODONE AND ACETAMINOPHEN 5; 325 MG/1; MG/1
1 TABLET ORAL
Qty: 12 | Refills: 0
Start: 2022-12-16

## 2022-12-16 RX ORDER — HYDROMORPHONE HYDROCHLORIDE 2 MG/ML
1 INJECTION INTRAMUSCULAR; INTRAVENOUS; SUBCUTANEOUS ONCE
Refills: 0 | Status: DISCONTINUED | OUTPATIENT
Start: 2022-12-16 | End: 2022-12-16

## 2022-12-16 RX ORDER — DIPHENHYDRAMINE HCL 50 MG
50 CAPSULE ORAL ONCE
Refills: 0 | Status: COMPLETED | OUTPATIENT
Start: 2022-12-16 | End: 2022-12-16

## 2022-12-16 RX ORDER — SODIUM CHLORIDE 9 MG/ML
1000 INJECTION INTRAMUSCULAR; INTRAVENOUS; SUBCUTANEOUS ONCE
Refills: 0 | Status: COMPLETED | OUTPATIENT
Start: 2022-12-16 | End: 2022-12-16

## 2022-12-16 RX ORDER — PIPERACILLIN AND TAZOBACTAM 4; .5 G/20ML; G/20ML
3.38 INJECTION, POWDER, LYOPHILIZED, FOR SOLUTION INTRAVENOUS ONCE
Refills: 0 | Status: COMPLETED | OUTPATIENT
Start: 2022-12-16 | End: 2022-12-16

## 2022-12-16 RX ORDER — VANCOMYCIN HCL 1 G
1000 VIAL (EA) INTRAVENOUS ONCE
Refills: 0 | Status: COMPLETED | OUTPATIENT
Start: 2022-12-16 | End: 2022-12-16

## 2022-12-16 RX ADMIN — Medication 1000 MILLIGRAM(S): at 17:50

## 2022-12-16 RX ADMIN — PIPERACILLIN AND TAZOBACTAM 3.38 GRAM(S): 4; .5 INJECTION, POWDER, LYOPHILIZED, FOR SOLUTION INTRAVENOUS at 16:50

## 2022-12-16 RX ADMIN — PIPERACILLIN AND TAZOBACTAM 200 GRAM(S): 4; .5 INJECTION, POWDER, LYOPHILIZED, FOR SOLUTION INTRAVENOUS at 16:12

## 2022-12-16 RX ADMIN — HYDROMORPHONE HYDROCHLORIDE 1 MILLIGRAM(S): 2 INJECTION INTRAMUSCULAR; INTRAVENOUS; SUBCUTANEOUS at 18:10

## 2022-12-16 RX ADMIN — Medication 250 MILLIGRAM(S): at 16:13

## 2022-12-16 RX ADMIN — Medication 50 MILLIGRAM(S): at 17:30

## 2022-12-16 RX ADMIN — SODIUM CHLORIDE 1000 MILLILITER(S): 9 INJECTION INTRAMUSCULAR; INTRAVENOUS; SUBCUTANEOUS at 16:13

## 2022-12-16 RX ADMIN — SODIUM CHLORIDE 1000 MILLILITER(S): 9 INJECTION INTRAMUSCULAR; INTRAVENOUS; SUBCUTANEOUS at 17:13

## 2022-12-16 NOTE — ED PROVIDER NOTE - CARE PROVIDER_API CALL
Noé Winter)  Surgery  [address], [place] [id number]  Phone: [phone]  Fax: [phone]  Established Patient  Follow Up Time: 4-6 Days

## 2022-12-16 NOTE — ED PROVIDER NOTE - NSFOLLOWUPINSTRUCTIONS_ED_ALL_ED_FT
Incision and Drainage, Care After      This sheet gives you information about how to care for yourself after your procedure. Your health care provider may also give you more specific instructions. If you have problems or questions, contact your health care provider.      What can I expect after the procedure?    After the procedure, it is common to have:  •Pain or discomfort around the incision site.      •Blood, fluid, or pus (drainage) from the incision.      •Redness and firm skin around the incision site.        Follow these instructions at home:    Medicines     •Take over-the-counter and prescription medicines only as told by your health care provider.      •If you were prescribed an antibiotic medicine, use or take it as told by your health care provider. Do not stop using the antibiotic even if you start to feel better.        Wound care   Two stitched wounds. One is normal. The other is red with pus and infected.    Follow instructions from your health care provider about how to take care of your wound. Make sure you:  •Wash your hands with soap and water before and after you change your bandage (dressing). If soap and water are not available, use hand .    •Change your dressing and packing as told by your health care provider.  •If your dressing is dry or stuck when you try to remove it, moisten or wet the dressing with saline or water so that it can be removed without harming your skin or tissues.      •If your wound is packed, leave it in place until your health care provider tells you to remove it. To remove the packing, moisten or wet the packing with saline or water so that it can be removed without harming your skin or tissues.        •Leave stitches (sutures), skin glue, or adhesive strips in place. These skin closures may need to stay in place for 2 weeks or longer. If adhesive strip edges start to loosen and curl up, you may trim the loose edges. Do not remove adhesive strips completely unless your health care provider tells you to do that.      Check your wound every day for signs of infection. Check for:  •More redness, swelling, or pain.      •More fluid or blood.      •Warmth.      •Pus or a bad smell.      If you were sent home with a drain tube in place, follow instructions from your health care provider about:  •How to empty it.      •How to care for it at home.      General instructions     •Rest the affected area.      • Do not take baths, swim, or use a hot tub until your health care provider approves. Ask your health care provider if you may take showers. You may only be allowed to take sponge baths.      •Return to your normal activities as told by your health care provider. Ask your health care provider what activities are safe for you. Your health care provider may put you on activity or lifting restrictions.      •The incision will continue to drain. It is normal to have some clear or slightly bloody drainage. The amount of drainage should lessen each day.      • Do not apply any creams, ointments, or liquids unless you have been told to by your health care provider.      •Keep all follow-up visits as told by your health care provider. This is important.        Contact a health care provider if:    •Your cyst or abscess returns.      •You have more redness, swelling, or pain around your incision.      •You have more fluid or blood coming from your incision.      •Your incision feels warm to the touch.      •You have pus or a bad smell coming from your incision.      •You have red streaks above or below the incision site.        Get help right away if:    •You have severe pain or bleeding.      •You cannot eat or drink without vomiting.      •You have a fever or chills.      •You have redness that spreads quickly.      •You have decreased urine output.      •You become short of breath.      •You have chest pain.      •You cough up blood.      •The affected area becomes numb or starts to tingle.      These symptoms may represent a serious problem that is an emergency. Do not wait to see if the symptoms will go away. Get medical help right away. Call your local emergency services (911 in the [place] Do not drive yourself to the hospital.       Summary    •After this procedure, it is common to have fluid, blood, or pus coming from the surgery site.      •Follow all home care instructions. You will be told how to take care of your incision, how to check for infection, and how to take medicines.      •If you were prescribed an antibiotic medicine, take it as told by your health care provider. Do not stop taking the antibiotic even if you start to feel better.      •Contact a health care provider if you have increased redness, swelling, or pain around your incision. Get help right away if you have chest pain, you vomit, you cough up blood, or you have shortness of breath.      •Keep all follow-up visits as told by your health care provider. This is important.      This information is not intended to replace advice given to you by your health care provider. Make sure you discuss any questions you have with your health care provider.

## 2022-12-16 NOTE — ED PROVIDER NOTE - OBJECTIVE STATEMENT
37 year old female with PMHx of DM, Hashimoto's, and umbilical hernia with abscess s/p drainage a few months ago presents to the ED complaining of swelling, drainage, and bleeding to belly button. Pt currently on antibiotics prescribed by GI Dr. Aldrich. Pt spoke with Dr. Winter about this today, told to come to ED.

## 2022-12-16 NOTE — CONSULT NOTE ADULT - ASSESSMENT
Recurrent umbilical abscess with low grade cellulitis.  S/P distant outpatient local care and treatment.  Now also s/p more recent inpatient care with local operative drainage.  Hernia minimal and fat containing and seemingly incidental by her complaints and CT.  However, collection is significant by exam and actively draining once again.  As such, formal ER drainage is again appropriate.  R/B/N of this discussed with patient and mother in detail.  They are pleased and eager to proceed.  We have specifically discussed in detail the need for packing, the plan for healing by secondary intention and my preliminary recommendation for more formal operative exploration with removal of involved tissues/ +/- umbilicus following resolution of acute process.  -Site sterilely prepped and draped, 1% lidocaine with epi used for local, draining would extended with scalpel from 5 mm to ~ 1.5 cm, cavity irrigated, site then packed, hemostasis achieved, DSD applied, tolerated well.  Vitals and labs appropriate for discharge.  Will discharge on Keflex with plan for outpatient follow-up in office next week.  Ms. Diane is pleased, agrees, leaves in good spirits and is able to verbalize the issues/ plan/ instructions as outlined above.

## 2022-12-16 NOTE — ED ADULT NURSE REASSESSMENT NOTE - NS ED NURSE REASSESS COMMENT FT1
Pt returned from CT with IV contrast feeling itching and hives on L side of face, no resp distress or difficulty breathing, Karlene AGUILAR made aware, benadryl given IVP per order, VSS. will continue to monitor. Pt returned from CT with IV contrast feeling itching and hives on L side of face, no resp distress or difficulty breathing, throat itching or nausea, Lungs CTAB, in NAD, speaking full sentences, Karlene AGUILAR made aware, benadryl given IVP per order, VSS. will continue to monitor.

## 2022-12-16 NOTE — ED PROVIDER NOTE - PROGRESS NOTE DETAILS
I and D performed by Dr Winter. Recommends DC on Keflex and Percocet and office follow up next week.

## 2022-12-16 NOTE — ED PROVIDER NOTE - CLINICAL SUMMARY MEDICAL DECISION MAKING FREE TEXT BOX
37 year old female with umbilical hernia now with bleeding and drainage, rule out abscess. Plan: EKG, labs, urine analysis, IV fluids, IV Zosyn, IV Vancomycin, CT abd/pelvis, may need surgical evaluation.

## 2022-12-16 NOTE — CONSULT NOTE ADULT - SUBJECTIVE AND OBJECTIVE BOX
HPI: Very pleasant 37 year old female known to me from prior distant General Surgery care.  History of prior umbilical abscess/ cellulitis responding to local care.  Now S/P prior operative drainage by another surgeon at an outside institution.  Today presenting with recurrent umbilical "swelling... pain... drainage..."  Her concern is for recurrent "abscess or is it my hernia?"      PAST MEDICAL & SURGICAL HISTORY:  Closed fracture of wrist  left wrist at age 6    Fracture of clavicle  at age 4.    DM (diabetes mellitus)    Umbilical hernia    Hashimoto&#x27;s disease    History of wisdom tooth extraction      H/O laparoscopic adjustable gastric banding  2012      REVIEW OF SYSTEMS  General: Denies fever or chills    Skin/Breast: See HPI  	  Ophthalmologic: Denies vision changes  	  ENMT: Denies nasopharyngeal discharge    Respiratory and Thorax: Denies SOB  	  Cardiovascular:	Denies CP    Gastrointestinal:	Denies upper or lower GI complaints    Genitourinary: Denies dysuria    Musculoskeletal:	Denies joint pain    Neurological: Denies focal deficits    Psychiatric: Admits to anxiety regarding this issue/ presentation, denies depression    Hematology/Lymphatics:	 Denies easy bruising or bleeding    Endocrine: Denies heat or cold intolerance    Allergic/Immunologic: Denies anaphylaxis      Allergies  frozen green beans (Hives; Flushing; Angioedema)  No Known Drug Allergies  Peaches (Urticaria; Flushing; Rhinitis)  peanuts (Hives; Angioedema)      SOCIAL HISTORY:  Denies tobacco use, EtOH abuse or illicit drug use    FAMILY HISTORY:  Family history of prostate cancer in father    Family history of ovarian cancer  mother      Vital Signs Last 24 Hrs  T(C): 36.7 (16 Dec 2022 19:00), Max: 36.7 (16 Dec 2022 15:47)  T(F): 98 (16 Dec 2022 19:00), Max: 98 (16 Dec 2022 15:47)  HR: 77 (16 Dec 2022 19:00) (77 - 92)  BP: 122/78 (16 Dec 2022 19:00) (118/78 - 167/91)  RR: 18 (16 Dec 2022 19:00) (16 - 18)  SpO2: 99% (16 Dec 2022 19:00) (97% - 99%)    Parameters below as of 16 Dec 2022 19:00  Patient On (Oxygen Delivery Method): room air      PHYSICAL EXAM:  Constitutional: Appears anxious, but NAD    Eyes: PERRL    ENMT: Moist mucosal membranes    Neck: Supple    Breasts: Deferred    Back: No CVAT     Respiratory: Equal expansion bilaterally    Cardiovascular: Pulse regular    Gastrointestinal: Soft and non tense with well healed scars c/w given or known history, no clinically apparent hernia at umbilicus, see skin exam below.    Genitourinary: Deferred    Rectal: Deferred    Extremities: Symmetric    Vascular: Brisk capillary refill    Neurological: A & O times three    Skin: Mild reactive erythema +/- low grade cellulitis around umbilicus, within center/ "pit" of umbilicus ~ 5 mm open wound with expressible copious serosanguineous discharge though with some foul odor though no gross purulence or necrotic tissues    Lymph Nodes: No cervical, supraclavicular, axillary or inguinal adenopathy    Musculoskeletal: No casts, splints or braces    Psychiatric: Anxious, but appropriately so, pleasant and interactive      LABS:                        14.3   7.57  )-----------( 255      ( 16 Dec 2022 16:00 )             43.8     12-16    138  |  102  |  10  ----------------------------<  148<H>  3.8   |  26  |  0.77    Ca    9.3      16 Dec 2022 16:00    TPro  8.2  /  Alb  3.6  /  TBili  0.6  /  DBili  x   /  AST  16  /  ALT  31  /  AlkPhos  82  12-16    PT/INR - ( 16 Dec 2022 16:00 )   PT: 13.6 sec;   INR: 1.15 ratio    PTT - ( 16 Dec 2022 16:00 )  PTT:32.4 sec    Urinalysis Basic - ( 16 Dec 2022 16:40 )  Color: Yellow / Appearance: Clear / S.015 / pH: x  Gluc: x / Ketone: Moderate  / Bili: Negative / Urobili: Negative mg/dL   Blood: x / Protein: 15 mg/dL / Nitrite: Negative   Leuk Esterase: Negative / RBC: Negative /HPF / WBC Negative   Sq Epi: x / Non Sq Epi: Occasional / Bacteria: Negative      RADIOLOGY & ADDITIONAL STUDIES:    ACC: [id number] EXAM:  CT ABDOMEN AND PELVIS IC                        PROCEDURE DATE:  2022    INTERPRETATION:  CLINICAL INFORMATION: Infected umbilical hernia.  COMPARISON: None.    CONTRAST/COMPLICATIONS:  IV Contrast: Omnipaque 350  92 cc administered   8 cc discarded  Oral Contrast: NONE  Complications: None reported at time of study completion    PROCEDURE:  CT of the Abdomen and Pelvis was performed.  Sagittal and coronal reformats were performed.    FINDINGS:  LOWER CHEST:Within normal limits.    LIVER: Steatosis.  BILE DUCTS: Normal caliber.  GALLBLADDER: Within normal limits.  SPLEEN: Within normal limits.  PANCREAS: Within normal limits.  ADRENALS: Within normal limits.  KIDNEYS/URETERS: Within normal limits.  BLADDER: Within normal limits.  REPRODUCTIVE ORGANS: Uterus and adnexa within normal limits.  BOWEL: No bowel obstruction. Appendix is normal.  PERITONEUM: No ascites.  VESSELS: Within normal limits.  RETROPERITONEUM/LYMPH NODES: No lymphadenopathy.  ABDOMINAL WALL: Small fat-containing umbilical hernia. Soft tissue   inflammation/phlegmon involving the umbilicus measuring 4.4 x 2.6 x 2.7   cm. No drainable collection seen.  BONES: Degenerative changes.    IMPRESSION:  Moderate inflammatory changes/phlegmon involving the abdominal wall at   the umbilicus. No drainable fluid collection to suggest abscess.    --- End of Report ---    MARINE BRODERICK MD; Attending Radiologist  This document has been electronically signed. Dec 16 2022  5:42PM

## 2022-12-16 NOTE — ED PROVIDER NOTE - GASTROINTESTINAL, MLM
Abdomen soft, non-tender, no guarding. Small umbilical hernia with bloody, foul-smelling drainage coming from umbilicus. No rebound.

## 2022-12-16 NOTE — ED ADULT NURSE NOTE - OBJECTIVE STATEMENT
38 y/o F PMH gastric bypass, GERD, obesity, umbilical hernia and abscess presenting to ED for umbilicus drainage. States she had her lap band placed through umbilicus and was removed, had umbilical hernia with abscess drained this Spring. Noticed blood and purulent drainage this morning along with generalized abd pain and was told by surgeon to come to ED. Denies CP, SOB, n/v/d, fevers, chills,  urinary symptoms, weakness, fatigue, numbness, tingling in upper and lower extremities, HA, blurry vision. VSS updated on plan of care. 36 y/o F PMH DM2,  gastric bypass, GERD, obesity, umbilical hernia and abscess presenting to ED for umbilicus drainage. States she had her lap band placed through umbilicus and was removed, had umbilical hernia with abscess drained this Spring. Noticed blood and purulent drainage this morning along with generalized abd pain and was told by surgeon to come to ED. Denies CP, SOB, n/v/d, fevers, chills,  urinary symptoms, weakness, fatigue, numbness, tingling in upper and lower extremities, HA, blurry vision. VSS updated on plan of care.

## 2022-12-16 NOTE — ED PROVIDER NOTE - PATIENT PORTAL LINK FT
You can access the FollowMyHealth Patient Portal offered by [facility] by registering at the following website: [email or web address]. By joining [facility]’s FollowMyHealth portal, you will also be able to view your health information using other applications (apps) compatible with our system.

## 2022-12-16 NOTE — ED PROVIDER NOTE - NSICDXPASTMEDICALHX_GEN_ALL_CORE_FT
PAST MEDICAL HISTORY:  Closed fracture of wrist left wrist at age 6    Fracture of clavicle at age 4.      DM (diabetes mellitus)     Hashimoto's disease     Umbilical hernia

## 2022-12-16 NOTE — ED ADULT TRIAGE NOTE - PAIN: PRESENCE, MLM
04794/1     Denisse Reyez MRN: [id number]  AGE: 75 year old  ADMIT DATE: 4/6/2022    CODE STATUS: Full Resuscitation  ISOLATION STATUS: Contact and Droplet   DIET: Cardiac, Sodium 2 Gm (low Sodium), Fluid Restrict 1500ml (900 From Dietary); 1.5l Fluid Restriction; Pt May Order From Dysphagia 2 Menu If Requested Diet    ALLERGIES:  Latex   (environmental), Penicillin g sodium, and Sulfa antibiotics     DX:Cellulitis of lower extremity, unspecified laterality  (primary encounter diagnosis)     Att: Mikaela Pcihardo DO  PCP: Yamilet Oates MD  IP Consult Orders (From admission, onward)                 Start     Ordered    04/09/22 1152  Inpatient consult to Pulmonology  ONE TIME        Provider:  Aram Diaz MD    04/09/22 1152                        BP: 133/75  Temp: 97.9 °F (36.6 °C)  Temp src: Oral  Heart Rate: 87  Resp: 18  SpO2: 91 %  Height: 5' 5\" (165.1 cm)  Weight: 120.1 kg (264 lb 12.4 oz)   Weight change:      No results available in last 24 hours     Creatinine (mg/dL)   Date Value   04/09/2022 1.16 (H)   04/08/2022 1.27 (H)     PTT (sec)   Date Value   10/12/2021 21 (L)     INR (no units)   Date Value   10/12/2021 1.0     WBC (K/mcL)   Date Value   04/09/2022 10.9   04/08/2022 8.2        I/O last 3 completed shifts:  In: 855 [P.O.:855]  Out: 1000 [Urine:1000]                         IMPORTANT EVENTS THIS SHIFT:  Patient educated on Aerobika device with RRT. Productive cough noted, cream colored.  Pt able to wear CPAP from [id number]. Strict I/O. Per patient, BLE cellulitis improving. PRN norco given for R knee pain  IMPORTANT EVENTS COMING UP/GOALS (PLEASE INCLUDE WHITE BOARD AND DISCHARGE BOARD UPDATES):    -Dc to [facility] with [facility]    -Sputum cultures pending   PATIENT SPECIAL NEEDS/ACCOMMODATIONS:    -Strict I/O  -1.5L fluid restriction  -Cellulitis of BLE  -Pt on 1L O2                            complains of pain/discomfort

## 2022-12-19 PROBLEM — E06.3 AUTOIMMUNE THYROIDITIS: Chronic | Status: ACTIVE | Noted: 2022-12-16

## 2022-12-19 PROBLEM — K42.9 UMBILICAL HERNIA WITHOUT OBSTRUCTION OR GANGRENE: Chronic | Status: ACTIVE | Noted: 2022-12-16

## 2022-12-19 PROBLEM — E11.9 TYPE 2 DIABETES MELLITUS WITHOUT COMPLICATIONS: Chronic | Status: ACTIVE | Noted: 2022-12-16

## 2022-12-19 LAB
CULTURE RESULTS: NO GROWTH — SIGNIFICANT CHANGE UP
SPECIMEN SOURCE: SIGNIFICANT CHANGE UP

## 2022-12-21 ENCOUNTER — APPOINTMENT (OUTPATIENT)
Dept: BARIATRICS | Facility: CLINIC | Age: 37
End: 2022-12-21

## 2022-12-21 ENCOUNTER — NON-APPOINTMENT (OUTPATIENT)
Age: 37
End: 2022-12-21

## 2022-12-21 ENCOUNTER — APPOINTMENT (OUTPATIENT)
Dept: SURGERY | Facility: CLINIC | Age: 37
End: 2022-12-21

## 2022-12-21 VITALS
BODY MASS INDEX: 45.57 KG/M2 | SYSTOLIC BLOOD PRESSURE: 100 MMHG | TEMPERATURE: 96.7 F | HEIGHT: 62.5 IN | DIASTOLIC BLOOD PRESSURE: 72 MMHG | WEIGHT: 254 LBS | OXYGEN SATURATION: 98 % | HEART RATE: 81 BPM

## 2022-12-21 LAB
CULTURE RESULTS: SIGNIFICANT CHANGE UP
CULTURE RESULTS: SIGNIFICANT CHANGE UP
SPECIMEN SOURCE: SIGNIFICANT CHANGE UP
SPECIMEN SOURCE: SIGNIFICANT CHANGE UP

## 2022-12-21 PROCEDURE — 99024 POSTOP FOLLOW-UP VISIT: CPT

## 2022-12-28 ENCOUNTER — APPOINTMENT (OUTPATIENT)
Dept: BARIATRICS | Facility: CLINIC | Age: 37
End: 2022-12-28
Payer: MEDICAID

## 2022-12-28 VITALS
TEMPERATURE: 96.7 F | WEIGHT: 254 LBS | OXYGEN SATURATION: 99 % | HEART RATE: 82 BPM | SYSTOLIC BLOOD PRESSURE: 100 MMHG | HEIGHT: 62.5 IN | BODY MASS INDEX: 45.57 KG/M2 | DIASTOLIC BLOOD PRESSURE: 74 MMHG

## 2022-12-28 PROCEDURE — 99212 OFFICE O/P EST SF 10 MIN: CPT

## 2022-12-28 NOTE — HISTORY OF PRESENT ILLNESS
[de-identified] : Pt of Dr Winter.  Previously seen by him for incision and drainage of umbilical abscess.  Returns today for dressing change.and wound assessmetn. [de-identified] : Mom has been assisting with dressing changes.   Reports no purulence.  Feels the cavity is closing.

## 2022-12-28 NOTE — HISTORY OF PRESENT ILLNESS
[de-identified] : Pt of Dr Winter.  Previously seen by him for incision and drainage of umbilical abscess.  Returns today for dressing change.and wound assessmetn.

## 2022-12-28 NOTE — ASSESSMENT
[FreeTextEntry1] : Unremarkable postoperative course status post incision and drainage of umbilical abscess by Dr. Winter.  Patient returns today for local wound assessment and dressing changes.  He denies fevers chills nausea or vomiting.  There is no cellulitis to the area of incision and drainage.  There is no purulent discharge.  Dressings removed.  Wound irrigated with normal saline hydrogen peroxide solution.  Packed once again with quarter inch iodoform gauze.  Local wound care instructions have been provided.  The patient demonstrates competency to perform dressing changes on her own.  We will follow-up with me again in 1 week

## 2022-12-28 NOTE — PHYSICAL EXAM
[Normal Breath Sounds] : Normal breath sounds [Normal Heart Sounds] : normal heart sounds [Normal Rate and Rhythm] : normal rate and rhythm [No Rash or Lesion] : No rash or lesion [Alert] : alert [Oriented to Person] : oriented to person [Oriented to Place] : oriented to place [Oriented to Time] : oriented to time [Calm] : calm [de-identified] : Obese woman in no distress [de-identified] : LATA DUFFY EOMI [de-identified] : Obese, soft, nontender, nondistended, positive bowel sounds in all four quadrants.  No hernia or masses.\par Umbilical dressing removed.  Tract patent with scant serous staining.  No purulence.  [de-identified] : Ambulating without difficulty or assistance

## 2022-12-28 NOTE — ASSESSMENT
[FreeTextEntry1] : Unremarkable postoperative course status post incision and drainage of umbilical abscess by Dr. Winter.  Patient returns today again  for local wound assessment and dressing changes.  She denies fevers chills nausea or vomiting.  There is no cellulitis to the area of incision and drainage.  There is no purulent discharge.  Dressings removed.  Wound irrigated with normal saline hydrogen peroxide solution.  Packed once again with quarter inch iodoform gauze.  Local wound care instructions have been provided.  The patient demonstrates competency to perform dressing changes on her own.  We will follow-up again in 1 week to see Dr Winter.\par Pt also plans on consulting with Plastic Surgeon for possible Panniculectomy.

## 2022-12-28 NOTE — PHYSICAL EXAM
[Normal Breath Sounds] : Normal breath sounds [Normal Heart Sounds] : normal heart sounds [Normal Rate and Rhythm] : normal rate and rhythm [No Rash or Lesion] : No rash or lesion [Alert] : alert [Oriented to Person] : oriented to person [Oriented to Place] : oriented to place [Oriented to Time] : oriented to time [Calm] : calm [de-identified] : Obese woman in no distress [de-identified] : LATA DUFFY EOMI [de-identified] : Obese, soft, nontender, nondistended, positive bowel sounds in all four quadrants.  No hernia or masses.\par Umbilical dressing removed.  Tract patent with scant serous staining.  No purulence.  [de-identified] : Ambulating without difficulty or assistance

## 2023-01-04 ENCOUNTER — APPOINTMENT (OUTPATIENT)
Dept: SURGERY | Facility: CLINIC | Age: 38
End: 2023-01-04
Payer: MEDICAID

## 2023-01-04 ENCOUNTER — APPOINTMENT (OUTPATIENT)
Dept: BARIATRICS | Facility: CLINIC | Age: 38
End: 2023-01-04

## 2023-01-04 VITALS
SYSTOLIC BLOOD PRESSURE: 100 MMHG | DIASTOLIC BLOOD PRESSURE: 72 MMHG | TEMPERATURE: 96.7 F | HEIGHT: 62.5 IN | BODY MASS INDEX: 46.11 KG/M2 | WEIGHT: 257 LBS | OXYGEN SATURATION: 98 % | HEART RATE: 83 BPM

## 2023-01-04 DIAGNOSIS — Z87.2 PERSONAL HISTORY OF DISEASES OF THE SKIN AND SUBCUTANEOUS TISSUE: ICD-10-CM

## 2023-01-04 PROCEDURE — 99214 OFFICE O/P EST MOD 30 MIN: CPT

## 2023-01-04 NOTE — HISTORY OF PRESENT ILLNESS
[de-identified] : Pt of Dr Winter.  Previously seen by him for incision and drainage of umbilical abscess.  Returns today for dressing change.and wound assessment. [de-identified] : Ms. Diane returns today with her mother in excellent spirits.\par They are pleased with her overall progress.\par She denies further discomfort/ pain.\par They both report compliance regarding local care.\par Ms. Diane has scheduled an appointment with Plastic Surgery as per my recommendation...

## 2023-01-04 NOTE — REVIEW OF SYSTEMS
[Feeling Poorly] : not feeling poorly [Feeling Tired] : not feeling tired [As Noted in HPI] : as noted in HPI [Anxiety] : anxiety [Depression] : no depression [Negative] : Heme/Lymph [de-identified] : regarding this issue and visit...

## 2023-01-04 NOTE — PLAN
No acute findings
[FreeTextEntry1] : S/P local anesthesia with uncomplicated ER drainage of recurrent umbilical abscess.\par Ms. PRYOR is clinically well by her history and surgically stable by my examination.\par There is no evidence by history or examination to suggest surgical complication or ongoing sepsis.\par Ms. PRYOR  is cleared to continue all activities with own comfort as guide.\par Plan now for ongoing daily warm water wash with soap, H202 rinse and simple DSD to umbilical for aeration.\par We have discussed excision of the umbilical tissues with open repair of small fat containing umbilical hernia.\par However, she is very concerned regarding cosmesis and potential loss of umbilicus- as such Plastic Surgery consultation requested and pending.\par Ms. PRYOR  has been encouraged to call with questions or concerns.  \par Otherwise, RTO in 2 weeks.  We have discussed her weight.  However, she is not interested in further Bariatric Surgery at this time and will only consider [facility] to [facility] medical assessment +/- treatment.\par Ms. PRYOR is pleased and agrees, leaving in good spirits able to verbalize instructions as outlined above. \par Please note that more than 50% of face to face time was spent in counseling and coordination of care.

## 2023-01-04 NOTE — PHYSICAL EXAM
[Normal Breath Sounds] : Normal breath sounds [Normal Heart Sounds] : normal heart sounds [Normal Rate and Rhythm] : normal rate and rhythm [No HSM] : no hepatosplenomegaly [Tender] : was nontender [Enlarged] : not enlarged [Alert] : alert [Oriented to Person] : oriented to person [Oriented to Place] : oriented to place [Oriented to Time] : oriented to time [Anxious] : anxious [de-identified] : Appears well, no acute distress, ambulates easily into office and assumes examination table without need of assistance.  [de-identified] : Normocephalic and atraumatic.  [de-identified] : Supple with full range of motion.  [FreeTextEntry1] : No cervical, supraclavicular, axillary or inguinal adenopathy.  [de-identified] : Deferred.  [de-identified] : Obese, soft, nontender, nondistended, positive bowel sounds in all four quadrants.  No clinically apparent hernia or masses.\par Umbilical dressing removed.  Tract now closed with small open wound with excellent granulation.  No expressible drainage or appreciable odor.  [de-identified] : Deferred.  [de-identified] : Deferred.  [de-identified] : Grossly symmetric and within normal limits without motor or sensory deficits.  [de-identified] : See abdominal exam. [de-identified] : though quite appropriately so...

## 2023-01-04 NOTE — DATA REVIEWED
[FreeTextEntry1] : ACC: [id number] EXAM:  CT ABDOMEN AND PELVIS IC                      \par \par PROCEDURE DATE:  12/16/2022  \par \par INTERPRETATION:  CLINICAL INFORMATION: Infected umbilical hernia.\par \par COMPARISON: None.\par \par CONTRAST/COMPLICATIONS:\par IV Contrast: Omnipaque 350  92 cc administered   8 cc discarded\par Oral Contrast: NONE\par Complications: None reported at time of study completion\par \par PROCEDURE:\par CT of the Abdomen and Pelvis was performed.\par Sagittal and coronal reformats were performed.\par \par FINDINGS:\par LOWER CHEST: Within normal limits.\par LIVER: Steatosis.\par BILE DUCTS: Normal caliber.\par GALLBLADDER: Within normal limits.\par SPLEEN: Within normal limits.\par PANCREAS: Within normal limits.\par ADRENALS: Within normal limits.\par KIDNEYS/URETERS: Within normal limits.\par BLADDER: Within normal limits.\par REPRODUCTIVE ORGANS: Uterus and adnexa within normal limits.\par BOWEL: No bowel obstruction. Appendix is normal.\par PERITONEUM: No ascites.\par VESSELS: Within normal limits.\par RETROPERITONEUM/LYMPH NODES: No lymphadenopathy.\par ABDOMINAL WALL: Small fat-containing umbilical hernia. Soft tissue \par inflammation/phlegmon involving the umbilicus measuring 4.4 x 2.6 x 2.7 \par cm. No drainable collection seen.\par BONES: Degenerative changes.\par \par IMPRESSION:\par Moderate inflammatory changes/phlegmon involving the abdominal wall at \par the umbilicus. No drainable fluid collection to suggest abscess.\par \par --- End of Report ---\par \par MARINE BRODERICK MD; Attending Radiologist\par This document has been electronically signed. Dec 16 2022  5:42PM\par

## 2023-02-03 ENCOUNTER — APPOINTMENT (OUTPATIENT)
Dept: PLASTIC SURGERY | Facility: CLINIC | Age: 38
End: 2023-02-03
Payer: MEDICAID

## 2023-02-03 ENCOUNTER — APPOINTMENT (OUTPATIENT)
Dept: SURGERY | Facility: CLINIC | Age: 38
End: 2023-02-03
Payer: MEDICAID

## 2023-02-03 ENCOUNTER — NON-APPOINTMENT (OUTPATIENT)
Age: 38
End: 2023-02-03

## 2023-02-03 VITALS
OXYGEN SATURATION: 99 % | DIASTOLIC BLOOD PRESSURE: 74 MMHG | HEIGHT: 62.5 IN | SYSTOLIC BLOOD PRESSURE: 100 MMHG | BODY MASS INDEX: 46.11 KG/M2 | TEMPERATURE: 96.7 F | WEIGHT: 257 LBS | HEART RATE: 86 BPM

## 2023-02-03 VITALS
DIASTOLIC BLOOD PRESSURE: 84 MMHG | HEIGHT: 63 IN | HEART RATE: 78 BPM | BODY MASS INDEX: 43.94 KG/M2 | SYSTOLIC BLOOD PRESSURE: 129 MMHG | OXYGEN SATURATION: 100 % | WEIGHT: 248 LBS

## 2023-02-03 DIAGNOSIS — Z98.890 OTHER SPECIFIED POSTPROCEDURAL STATES: ICD-10-CM

## 2023-02-03 DIAGNOSIS — L03.316 CELLULITIS OF UMBILICUS: ICD-10-CM

## 2023-02-03 PROCEDURE — 99203 OFFICE O/P NEW LOW 30 MIN: CPT

## 2023-02-03 PROCEDURE — 99215 OFFICE O/P EST HI 40 MIN: CPT

## 2023-02-08 PROBLEM — Z98.890 STATUS POST INCISION AND DRAINAGE: Status: RESOLVED | Noted: 2023-01-04 | Resolved: 2023-02-08

## 2023-02-08 PROBLEM — L03.316 CELLULITIS OF UMBILICUS: Status: RESOLVED | Noted: 2019-04-08 | Resolved: 2023-01-04

## 2023-02-08 RX ORDER — DEXMETHYLPHENIDATE HYDROCHLORIDE 5 MG/1
5 TABLET ORAL WEEKLY
Refills: 0 | Status: ACTIVE | COMMUNITY

## 2023-02-08 RX ORDER — LEVOTHYROXINE SODIUM 0.1 MG/1
100 TABLET ORAL DAILY
Refills: 0 | Status: ACTIVE | COMMUNITY

## 2023-02-08 RX ORDER — METFORMIN ER 500 MG 500 MG/1
500 TABLET ORAL TWICE DAILY
Refills: 0 | Status: ACTIVE | COMMUNITY

## 2023-02-08 RX ORDER — ERTUGLIFLOZIN 15 MG/1
15 TABLET, FILM COATED ORAL DAILY
Refills: 0 | Status: ACTIVE | COMMUNITY

## 2023-02-08 RX ORDER — METHYLPHENIDATE HYDROCHLORIDE 54 MG/1
54 TABLET, EXTENDED RELEASE ORAL DAILY
Refills: 0 | Status: ACTIVE | COMMUNITY

## 2023-02-08 RX ORDER — CHOLECALCIFEROL (VITAMIN D3) 125 MCG
TABLET ORAL WEEKLY
Refills: 0 | Status: ACTIVE | COMMUNITY

## 2023-02-08 NOTE — HISTORY OF PRESENT ILLNESS
[de-identified] : Pt of Dr Winter.  Previously seen by him for incision and drainage of umbilical abscess.  Returns today for dressing change.and wound assessment. [de-identified] : Ms. Diane returns today with her mother in continued excellent spirits.\par They are pleased with her overall progress and have had no issues since our last visit.\par She denies further discomfort or pain at the umbilicus.\par They both report compliance regarding local care and are happy to report the wound is closed without any ongoing odor or drainage.\par Ms. Diane has scheduled an appointment with Plastic Surgery which is pending for later today...

## 2023-02-08 NOTE — REVIEW OF SYSTEMS
[As Noted in HPI] : as noted in HPI [Anxiety] : anxiety [Negative] : Heme/Lymph [Feeling Poorly] : not feeling poorly [Feeling Tired] : not feeling tired [Depression] : no depression [de-identified] : regarding this issue and visit, though much less so...

## 2023-02-08 NOTE — PHYSICAL EXAM
[Normal Breath Sounds] : Normal breath sounds [Normal Heart Sounds] : normal heart sounds [Normal Rate and Rhythm] : normal rate and rhythm [No HSM] : no hepatosplenomegaly [Alert] : alert [Oriented to Person] : oriented to person [Oriented to Place] : oriented to place [Oriented to Time] : oriented to time [Anxious] : anxious [Tender] : was nontender [Enlarged] : not enlarged [de-identified] : Appears well, no acute distress, ambulates easily into the office.  [de-identified] : Remains normocephalic and atraumatic.  [de-identified] : Still supple with full range of motion.  [FreeTextEntry1] : No appreciable cervical, supraclavicular, axillary or inguinal adenopathy.  [de-identified] : Deferred.  [de-identified] : Obese but soft.\par Non tender. \par No clinically apparent hernia at umbilicus though difficult exam with body habitus.\par Umbilical incision closed and clean without odor or drainage without appreciable residual SQ collection. [de-identified] : Deferred.  [de-identified] : Deferred.  [de-identified] : Grossly symmetric and within normal limits without motor or sensory deficit.  [de-identified] : See the above abdominal exam. [de-identified] : though less so and still quite appropriately so...

## 2023-02-08 NOTE — PLAN
[FreeTextEntry1] : S/P uncomplicated ER drainage of RECURRENT umbilical abscess.\par Ms. PRYOR is clinically well by her history today and surgically stable by this examination.\par No evidence by history or examination to suggest surgical complication or ongoing sepsis.\par Ms. PRYOR  is cleared to continue all activities with her own comfort as guide.\par Plan remains for ongoing daily warm water wash with soap and H202 rinse to site.\par We have again discussed excision of the umbilical tissues with open repair of small fat containing umbilical hernia.\par R/B/N of this again discussed in detail.  Unless mandated, I would not use mesh given potential for contamination/ infection.\par However, she remains very concerned regarding cosmesis and potential loss/ disfigurement of umbilicus- as such Plastic Surgery consultation requested and pending for later today to review all options for approach and closure.\par Ms. PRYOR  has been encouraged to call with questions or concerns at any time.  \par Otherwise, RTO or phone follow-up after input from Plastic Surgery.  \par She is not interested in further Bariatric Surgery at this time and wishes to continue her own efforts and medical management.\par Ms. PRYOR is pleased and agrees.\par She and her mother leave in good spirits, able to verbalize the instructions as outlined above. \par Please note that more than 50% of face to face time was spent in counseling and coordination of care.

## 2023-02-08 NOTE — DATA REVIEWED
[FreeTextEntry1] : ACC: [id number] EXAM:  CT ABDOMEN AND PELVIS IC                      \par \par PROCEDURE DATE:  12/16/2022  \par \par INTERPRETATION:  CLINICAL INFORMATION: Infected umbilical hernia.\par \par COMPARISON: None.\par \par CONTRAST/COMPLICATIONS:\par IV Contrast: Omnipaque 350  92 cc administered   8 cc discarded\par Oral Contrast: NONE\par Complications: None reported at time of study completion\par \par PROCEDURE:\par CT of the Abdomen and Pelvis was performed.\par Sagittal and coronal reformats were performed.\par \par FINDINGS:\par LOWER CHEST: Within normal limits.\par LIVER: Steatosis.\par BILE DUCTS: Normal caliber.\par GALLBLADDER: Within normal limits.\par SPLEEN: Within normal limits.\par PANCREAS: Within normal limits.\par ADRENALS: Within normal limits.\par KIDNEYS/URETERS: Within normal limits.\par BLADDER: Within normal limits.\par REPRODUCTIVE ORGANS: Uterus and adnexa within normal limits.\par BOWEL: No bowel obstruction. Appendix is normal.\par PERITONEUM: No ascites.\par VESSELS: Within normal limits.\par RETROPERITONEUM/LYMPH NODES: No lymphadenopathy.\par ABDOMINAL WALL: Small fat-containing umbilical hernia. Soft tissue \par inflammation/phlegmon involving the umbilicus measuring 4.4 x 2.6 x 2.7 \par cm. No drainable collection seen.\par BONES: Degenerative changes.\par \par IMPRESSION:\par Moderate inflammatory changes/phlegmon involving the abdominal wall at \par the umbilicus. No drainable fluid collection to suggest abscess.\par \par --- End of Report ---\par \par MARINE BRODERICK MD; Attending Radiologist\par This document has been electronically signed. Dec 16 2022  5:42PM\par

## 2023-02-17 ENCOUNTER — NON-APPOINTMENT (OUTPATIENT)
Age: 38
End: 2023-02-17

## 2023-03-04 NOTE — HISTORY OF PRESENT ILLNESS
[FreeTextEntry1] : NICOLA PRYOR is a 37 year female presenting to the office today with history of recurrent umbilical abscess. Patient states this began in 2018, with Incision and drainage required at times.  She even required hospitalization for the umbilical abscess. Patient as well states she currently has an umbilical hernia\par Patient with history of 40lb weight loss following bariatric surgery. She has PSHx of lap band placement and then removal.  Her weight has been stable for more than 6 months She c/o excess skin to her abdomen and to rashes beneath her pannus. She admits to the rashes/irritation worsening in the summertime.   Patient states it is difficult to keep that area dry and she attempts use of powders and Lume, but without any resolution  to the rashes/irritation. She as well states she finds difficulty fitting into clothing and that her abdominal pannus interferes with exercise. \par PMHx- DM, ADHD, h/o pancreatitis, Hashimoto's \par PSHx- lap band placement and removal\par Allergies- denies\par Former tobacco use\par Denies any significant family history\par Patient works as a teacher.

## 2023-03-04 NOTE — PHYSICAL EXAM
[NI] : Normal [de-identified] : NAD, AxOx3  [de-identified] : nonlabored breathing  [de-identified] : soft, nondistended, umbilical incision is closed and healed.  No drainage. There is no appreciable hernia due to habitus. There is an abdominal pannus which extends to the mons pubis.  There are well healed surgical scars [de-identified] : normal HR [de-identified] : n [de-identified] : no edema  [de-identified] : as above  [de-identified] : grossly intact  [de-identified] : normal affect

## 2023-03-04 NOTE — CONSULT LETTER
[Dear  ___] : Dear  [unfilled], [Consult Letter:] : I had the pleasure of evaluating your patient, [unfilled]. [Please see my note below.] : Please see my note below. [Consult Closing:] : Thank you very much for allowing me to participate in the care of this patient.  If you have any questions, please do not hesitate to contact me. [Sincerely,] : Sincerely, [FreeTextEntry3] : Dr. Lauren Shikowitz-Behr, MD\par Board Certified Plastic and Reconstructive Surgeon\par [facility]\par  in Plastic Surgery, [facility]\par \par  [address] [place] [address]\[place] [address]\par [phone]\par \par  [FreeTextEntry2] : Dr. Noé Winter

## 2023-03-24 ENCOUNTER — OUTPATIENT (OUTPATIENT)
Dept: OUTPATIENT SERVICES | Facility: HOSPITAL | Age: 38
LOS: 1 days | End: 2023-03-24
Payer: MEDICAID

## 2023-03-24 VITALS
OXYGEN SATURATION: 98 % | TEMPERATURE: 98 F | WEIGHT: 246.92 LBS | RESPIRATION RATE: 16 BRPM | DIASTOLIC BLOOD PRESSURE: 66 MMHG | HEART RATE: 68 BPM | SYSTOLIC BLOOD PRESSURE: 99 MMHG | HEIGHT: 63 IN

## 2023-03-24 DIAGNOSIS — L02.216 CUTANEOUS ABSCESS OF UMBILICUS: ICD-10-CM

## 2023-03-24 DIAGNOSIS — Z01.818 ENCOUNTER FOR OTHER PREPROCEDURAL EXAMINATION: ICD-10-CM

## 2023-03-24 DIAGNOSIS — Z98.84 BARIATRIC SURGERY STATUS: Chronic | ICD-10-CM

## 2023-03-24 DIAGNOSIS — K43.9 VENTRAL HERNIA WITHOUT OBSTRUCTION OR GANGRENE: ICD-10-CM

## 2023-03-24 LAB
ANION GAP SERPL CALC-SCNC: 11 MMOL/L — SIGNIFICANT CHANGE UP (ref 5–17)
BLD GP AB SCN SERPL QL: SIGNIFICANT CHANGE UP
BUN SERPL-MCNC: 14 MG/DL — SIGNIFICANT CHANGE UP (ref 7–23)
CALCIUM SERPL-MCNC: 9.2 MG/DL — SIGNIFICANT CHANGE UP (ref 8.4–10.5)
CHLORIDE SERPL-SCNC: 103 MMOL/L — SIGNIFICANT CHANGE UP (ref 96–108)
CO2 SERPL-SCNC: 27 MMOL/L — SIGNIFICANT CHANGE UP (ref 22–31)
CREAT SERPL-MCNC: 0.65 MG/DL — SIGNIFICANT CHANGE UP (ref 0.5–1.3)
EGFR: 116 ML/MIN/1.73M2 — SIGNIFICANT CHANGE UP
GLUCOSE SERPL-MCNC: 113 MG/DL — HIGH (ref 70–99)
HCT VFR BLD CALC: 43.5 % — SIGNIFICANT CHANGE UP (ref 34.5–45)
HCT VFR BLD CALC: 46.7 % — HIGH (ref 34.5–45)
HGB BLD-MCNC: 13.8 G/DL — SIGNIFICANT CHANGE UP (ref 11.5–15.5)
HGB BLD-MCNC: 15.8 G/DL — HIGH (ref 11.5–15.5)
MCHC RBC-ENTMCNC: 25.2 PG — LOW (ref 27–34)
MCHC RBC-ENTMCNC: 29.6 PG — SIGNIFICANT CHANGE UP (ref 27–34)
MCHC RBC-ENTMCNC: 31.7 GM/DL — LOW (ref 32–36)
MCHC RBC-ENTMCNC: 33.8 GM/DL — SIGNIFICANT CHANGE UP (ref 32–36)
MCV RBC AUTO: 79.4 FL — LOW (ref 80–100)
MCV RBC AUTO: 87.5 FL — SIGNIFICANT CHANGE UP (ref 80–100)
NRBC # BLD: 0 /100 WBCS — SIGNIFICANT CHANGE UP (ref 0–0)
NRBC # BLD: 0 /100 WBCS — SIGNIFICANT CHANGE UP (ref 0–0)
PLATELET # BLD AUTO: 293 K/UL — SIGNIFICANT CHANGE UP (ref 150–400)
PLATELET # BLD AUTO: 304 K/UL — SIGNIFICANT CHANGE UP (ref 150–400)
POTASSIUM SERPL-MCNC: 4.1 MMOL/L — SIGNIFICANT CHANGE UP (ref 3.5–5.3)
POTASSIUM SERPL-SCNC: 4.1 MMOL/L — SIGNIFICANT CHANGE UP (ref 3.5–5.3)
RBC # BLD: 5.34 M/UL — HIGH (ref 3.8–5.2)
RBC # BLD: 5.48 M/UL — HIGH (ref 3.8–5.2)
RBC # FLD: 12.8 % — SIGNIFICANT CHANGE UP (ref 10.3–14.5)
RBC # FLD: 14 % — SIGNIFICANT CHANGE UP (ref 10.3–14.5)
SODIUM SERPL-SCNC: 141 MMOL/L — SIGNIFICANT CHANGE UP (ref 135–145)
WBC # BLD: 7.58 K/UL — SIGNIFICANT CHANGE UP (ref 3.8–10.5)
WBC # BLD: 9.45 K/UL — SIGNIFICANT CHANGE UP (ref 3.8–10.5)
WBC # FLD AUTO: 7.58 K/UL — SIGNIFICANT CHANGE UP (ref 3.8–10.5)
WBC # FLD AUTO: 9.45 K/UL — SIGNIFICANT CHANGE UP (ref 3.8–10.5)

## 2023-03-24 PROCEDURE — 93005 ELECTROCARDIOGRAM TRACING: CPT

## 2023-03-24 PROCEDURE — G0463: CPT

## 2023-03-24 PROCEDURE — 93010 ELECTROCARDIOGRAM REPORT: CPT

## 2023-03-24 RX ORDER — DEXTROSE 50 % IN WATER 50 %
12.5 SYRINGE (ML) INTRAVENOUS ONCE
Refills: 0 | Status: DISCONTINUED | OUTPATIENT
Start: 2023-04-05 | End: 2023-04-06

## 2023-03-24 RX ORDER — GLUCAGON INJECTION, SOLUTION 0.5 MG/.1ML
1 INJECTION, SOLUTION SUBCUTANEOUS ONCE
Refills: 0 | Status: DISCONTINUED | OUTPATIENT
Start: 2023-04-05 | End: 2023-04-06

## 2023-03-24 RX ORDER — DEXTROSE 50 % IN WATER 50 %
25 SYRINGE (ML) INTRAVENOUS ONCE
Refills: 0 | Status: DISCONTINUED | OUTPATIENT
Start: 2023-04-05 | End: 2023-04-06

## 2023-03-24 RX ORDER — DEXTROSE 50 % IN WATER 50 %
15 SYRINGE (ML) INTRAVENOUS ONCE
Refills: 0 | Status: DISCONTINUED | OUTPATIENT
Start: 2023-04-05 | End: 2023-04-06

## 2023-03-24 NOTE — H&P PST ADULT - NSICDXPASTMEDICALHX_GEN_ALL_CORE_FT
PAST MEDICAL HISTORY:  Closed fracture of wrist left wrist at age 6    DM (diabetes mellitus)     Fracture of clavicle at age 4.    Hashimoto's disease     History of ventral hernia     Hypothyroidism     Umbilical hernia

## 2023-03-24 NOTE — H&P PST ADULT - ASSESSMENT
37 y/o female with ventral hernia  Planned surgery.-ventral hernia repair  Will obtain medical clearance  covid testing 4/2/23  Pre op instructions provided  Instructions provided on medications to continue and to take the day morning of surgery

## 2023-03-24 NOTE — H&P PST ADULT - NSICDXPASTSURGICALHX_GEN_ALL_CORE_FT
PAST SURGICAL HISTORY:  H/O laparoscopic adjustable gastric banding 2012    History of removal of laparoscopic gastric banding device     History of wisdom tooth extraction 2008

## 2023-03-24 NOTE — H&P PST ADULT - NSANTHAGERD_ENT_A_CORE
Clinical Summary
  Created on: 2018
 
 Tiffanie Hernández
 External Reference #: [id number]
 : 89
 Sex: Female
 
 Demographics
 
 
+-----------------------+---------------------+
| Address               | [address] |
|                       | [place]  [address] |
+-----------------------+---------------------+
| Home Phone            | [phone]     |
+-----------------------+---------------------+
| Preferred Language    | Unknown             |
+-----------------------+---------------------+
| Marital Status        |              |
+-----------------------+---------------------+
| Tenriism Affiliation | BOBBI CATHOL        |
+-----------------------+---------------------+
| Race                  | Other Race          |
+-----------------------+---------------------+
| Ethnic Group          |  or   |
+-----------------------+---------------------+
 
 
 Author
 
 
+--------------+---------------+
| Author       | [facility] |
+--------------+---------------+
| Organization | [facility] |
+--------------+---------------+
| Address      | Unknown       |
+--------------+---------------+
| Phone        | Unavailable   |
+--------------+---------------+
 
 
 
 Support
 
 
+------------------+--------------+---------------------+-----------------+
| Name             | Relationship | Address             | Phone           |
+------------------+--------------+---------------------+-----------------+
| SHOSHANA GEE | ECON         | [address]               | [phone] |
|                  |              | [place]  [address] |                 |
+------------------+--------------+---------------------+-----------------+
 
 
 
 Care Team Providers
 
 
 
+-----------------------------+------+-------------+
| Care Team Member Name       | Role | Phone       |
+-----------------------------+------+-------------+
| None Per Patient, None Per  | PP   | Unavailable |
| Pt                          |      |             |
+-----------------------------+------+-------------+
 
 
 
 Allergies
 No Known Allergies
 
 Current Medications
 
 
+----------------------+----------------------+---------+---------+------+------+-------+
| Prescription         | Sig.                 | Disp.   | Refills | Star | End  | Statu |
|                      |                      |         |         | t    | Date | s     |
|                      |                      |         |         | Date |      |       |
+----------------------+----------------------+---------+---------+------+------+-------+
|   Prenatal           | Take 1 tablet by     |         |         |      |      | Activ |
| Vit27&Calcium-Iron-F | mouth daily          |         |         |      |      | e     |
| A 60 mg iron-1 mg    |                      |         |         |      |      |       |
| per tablet           |                      |         |         |      |      |       |
+----------------------+----------------------+---------+---------+------+------+-------+
|   Iron 18 mg Tab     | Take by mouth        |         |         |      |      | Activ |
|                      |                      |         |         |      |      | e     |
+----------------------+----------------------+---------+---------+------+------+-------+
|   oxyCODONE          | Take 1 tablet (5 mg  |   60    | 0       | 03/1 |      | Activ |
| (ROXICODONE) 5 mg    | total) by mouth      | tablet  |         | 7/20 |      | e     |
| immediate release    | every 4 hours as     |         |         | 17   |      |       |
| tablet               | needed for Pain      |         |         |      |      |       |
+----------------------+----------------------+---------+---------+------+------+-------+
|   ibuprofen          | Take 1 tablet (600   |   60    | 0       | 03/1 |      | Activ |
| (ADVIL;MOTRIN) 600   | mg total) by mouth   | tablet  |         | 7/20 |      | e     |
| mg tablet            | every 6 hours as     |         |         | 17   |      |       |
|                      | needed               |         |         |      |      |       |
+----------------------+----------------------+---------+---------+------+------+-------+
|   docusate sodium    | Take 1 capsule (100  |   60    | 0       | 03/1 |      | Activ |
| (COLACE) 100 mg      | mg total) by mouth 2 | capsule |         | 7/20 |      | e     |
| capsule              |  times daily         |         |         | 17   |      |       |
+----------------------+----------------------+---------+---------+------+------+-------+
|   acetaminophen      | Take 2 tablets (650  |   60    | 0       | 03/ |      | Activ |
| (TYLENOL) 325 mg     | mg total) by mouth   | tablet  |         |  |      | e     |
| tablet               | every 6 hours as     |         |         | 17   |      |       |
|                      | needed               |         |         |      |      |       |
+----------------------+----------------------+---------+---------+------+------+-------+
 
 
 
 Active Problems
 
 
+--------------------------------------------------------------+------------+
| Problem                                                      | Noted Date |
+--------------------------------------------------------------+------------+
| Single delivery by  section                          | 03/15/2017 |
+--------------------------------------------------------------+------------+
| Obesity complicating pregnancy, childbirth, or puerperium,   | 03/15/2017 |
 
| antepartum                                                   |            |
+--------------------------------------------------------------+------------+
| GDM, class A2                                                | 2017 |
+--------------------------------------------------------------+------------+
| Macrosomia of fetus affecting management of mother in third  | 2017 |
| trimester                                                    |            |
+--------------------------------------------------------------+------------+
| History of  delivery affecting pregnancy             | 2017 |
+--------------------------------------------------------------+------------+
| Fetal arrhythmia affecting pregnancy, antepartum             | 2017 |
+--------------------------------------------------------------+------------+
 
 
 
+-----------------------------------------------------------------+
|   Overview:   Per cardiology, need  ECHO after delivery |
+-----------------------------------------------------------------+
 
 
 
+--------------------------------------------+------------+
| Breech presentation with  problem | 2017 |
+--------------------------------------------+------------+
 
 
 
 Immunizations
 
 
+-----------+------------------------+----------+
| Name      | Dates Previously Given | Next Due |
+-----------+------------------------+----------+
| Influenza | 2016             |          |
+-----------+------------------------+----------+
| Tdap      | 2016             |          |
+-----------+------------------------+----------+
 
 
 
 Family History
 
 
+-----------------+-----------+------+----------+
| Medical History | Relation  | Name | Comments |
+-----------------+-----------+------+----------+
| Heart Disease   | Father    |      |          |
+-----------------+-----------+------+----------+
| Cancer          | Paternal  |      |          |
|                 | Grandmoth |      |          |
|                 | er        |      |          |
+-----------------+-----------+------+----------+
 
 
 
+----------------------+------+--------+----------+
| Relation             | Name | Status | Comments |
+----------------------+------+--------+----------+
| Father               |      |        |          |
+----------------------+------+--------+----------+
| Paternal Grandmother |      |        |          |
 
+----------------------+------+--------+----------+
 
 
 
 Social History
 
 
+--------------+-------+-----------+--------+------+
| Tobacco Use  | Types | Packs/Day | Years  | Date |
|              |       |           | Used   |      |
+--------------+-------+-----------+--------+------+
| Never Smoker |       |           |        |      |
+--------------+-------+-----------+--------+------+
 
 
 
+---------------------+---+---+---+
| Smokeless Tobacco:  |   |   |   |
| Never Used          |   |   |   |
+---------------------+---+---+---+
 
 
 
+-------------+-----------+---------+----------+
| Alcohol Use | Drinks/We | oz/Week | Comments |
|             | ek        |         |          |
+-------------+-----------+---------+----------+
| No          |           |         |          |
+-------------+-----------+---------+----------+
 
 
 
+-----------------+---------------+----------+----------+
| Sexually Active | Birth Control | Partners | Comments |
+-----------------+---------------+----------+----------+
| Yes             |               |          |          |
+-----------------+---------------+----------+----------+
 
 
 
+------------------+---------------+
| Sex Assigned at  | Date Recorded |
| Birth            |               |
+------------------+---------------+
| Not on file      |               |
+------------------+---------------+
 
 
 
 Last Filed Vital Signs
 
 
+-------------------+---------------------+-------------------------+
| Vital Sign        | Reading             | Time Taken              |
+-------------------+---------------------+-------------------------+
| Blood Pressure    | 113/78              | 2017  9:25 AM PDT |
+-------------------+---------------------+-------------------------+
| Pulse             | 80                  | 2017  9:25 AM PDT |
+-------------------+---------------------+-------------------------+
| Temperature       | 36.9   C (98.4   F) | 2017  9:25 AM PDT |
 
+-------------------+---------------------+-------------------------+
| Respiratory Rate  | 18                  | 2017  9:25 AM PDT |
+-------------------+---------------------+-------------------------+
| Oxygen Saturation | 97%                 | 2017  3:55 PM PDT |
+-------------------+---------------------+-------------------------+
| Inhaled Oxygen    | -                   | -                       |
| Concentration     |                     |                         |
+-------------------+---------------------+-------------------------+
| Weight            | 126.1 kg (278 lb)   | 2017  5:00 PM PDT |
+-------------------+---------------------+-------------------------+
| Height            | 157.5 cm (5' 2")    | 2017  5:02 PM PDT |
+-------------------+---------------------+-------------------------+
| Body Mass Index   | 50.85               | 2017  5:00 PM PDT |
+-------------------+---------------------+-------------------------+
 
 
 
 Plan of Treatment
 
 
+--------------------+-----------+------------+----------+
| Health Maintenance | Due Date  | Last Done  | Comments |
+--------------------+-----------+------------+----------+
| HIV Screening      |  |            |          |
|                    | 5         |            |          |
+--------------------+-----------+------------+----------+
| Cervical Cancer    |  |            |          |
| Screening          | 1         |            |          |
+--------------------+-----------+------------+----------+
| IMM Influenza (#1) | 10/01/201 | 2016 |          |
|                    | 7         |            |          |
+--------------------+-----------+------------+----------+
| Tetanus            |  | 2016 |          |
|                    | 6         |            |          |
+--------------------+-----------+------------+----------+
 
 
 
 Results
 Not on filefrom Last 3 Months
 
 Insurance
 
 
+-----------------+--------+-------------+--------+-------------+------------------+
| Payer           | Benefi | Subscriber  | Type   | Phone       | Address          |
|                 | t Plan | ID          |        |             |                  |
|                 |  /     |             |        |             |                  |
|                 | Group  |             |        |             |                  |
+-----------------+--------+-------------+--------+-------------+------------------+
| [place]ICAID [place] | MEDICA | [id number]    | Medica | [phone] |   [address] [address]   |
|                 | [place]  |             | id     | [id number]        | [place] [address]  |
|                 | DMAP   |             |        |             |                  |
|                 | CAWEM  |             |        |             |                  |
+-----------------+--------+-------------+--------+-------------+------------------+
 
 
 
+---------------------+--------+-------------+--------+-------------+---------------------+
| Guarantor Name      | Accoun | Relation to | Date   | Phone       | Billing Address     |
 
|                     | t Type |  Patient    | of     |             |                     |
|                     |        |             | Birth  |             |                     |
+---------------------+--------+-------------+--------+-------------+---------------------+
| TIFFANIE HERNÁNDEZ | Person | Self        | / |   Home:     |   [address]  |
|                     | al/Fam |             |    | [phone] | [address]  [place]    |
|                     | mike    |             |        | [phone]        | [address]               |
+---------------------+--------+-------------+--------+-------------+---------------------+
 
 
 
 Advance Directives
 Patient has advance directives. For more information, please contact:[address] [place] [address] No

## 2023-03-24 NOTE — H&P PST ADULT - HISTORY OF PRESENT ILLNESS
37 y/o female with PMH of obesity, type 2 diabetes, hypothyroidism ADHD, ventral hernia for many years, abcess behind umbilicus aspiration done in past, scheduled for ventral hernia repair and related procedures

## 2023-03-24 NOTE — H&P PST ADULT - NSICDXPROCEDURE_GEN_ALL_CORE_FT
PROCEDURES:  Repair, hernia, inguinal, incarcerated, open, using mesh, adult 24-Mar-2023 13:31:52 ventral hernia Jacy Presley S   PROCEDURES:  Panniculectomy 24-Mar-2023 14:21:15  Jacy Presley  Repair, internal hernia 24-Mar-2023 14:21:29  Jacy Presley

## 2023-03-25 LAB
A1C WITH ESTIMATED AVERAGE GLUCOSE RESULT: 6.7 % — HIGH (ref 4–5.6)
ESTIMATED AVERAGE GLUCOSE: 146 MG/DL — HIGH (ref 68–114)

## 2023-04-02 RX ORDER — ONDANSETRON 4 MG/1
4 TABLET, ORALLY DISINTEGRATING ORAL
Qty: 9 | Refills: 0 | Status: DISCONTINUED | COMMUNITY
Start: 2023-04-02 | End: 2023-04-02

## 2023-04-02 RX ORDER — CEFADROXIL 500 MG/1
500 CAPSULE ORAL TWICE DAILY
Qty: 14 | Refills: 0 | Status: DISCONTINUED | COMMUNITY
Start: 2023-04-02 | End: 2023-04-02

## 2023-04-02 RX ORDER — OXYCODONE AND ACETAMINOPHEN 5; 325 MG/1; MG/1
5-325 TABLET ORAL
Qty: 24 | Refills: 0 | Status: DISCONTINUED | COMMUNITY
Start: 2023-04-02 | End: 2023-04-02

## 2023-04-03 ENCOUNTER — NON-APPOINTMENT (OUTPATIENT)
Age: 38
End: 2023-04-03

## 2023-04-04 ENCOUNTER — TRANSCRIPTION ENCOUNTER (OUTPATIENT)
Age: 38
End: 2023-04-04

## 2023-04-04 NOTE — PATIENT PROFILE ADULT - NSPROHMDIABETMGMTSTRAT_GEN_A_NUR
activity/adequate rest/blood glucose testing/diet modification/insulin therapy/medication therapy/routine screenings

## 2023-04-05 ENCOUNTER — INPATIENT (INPATIENT)
Facility: HOSPITAL | Age: 38
LOS: 0 days | Discharge: HOME | DRG: 354 | End: 2023-04-06
Attending: SURGERY | Admitting: SURGERY
Payer: MEDICAID

## 2023-04-05 ENCOUNTER — TRANSCRIPTION ENCOUNTER (OUTPATIENT)
Age: 38
End: 2023-04-05

## 2023-04-05 ENCOUNTER — RESULT REVIEW (OUTPATIENT)
Age: 38
End: 2023-04-05

## 2023-04-05 ENCOUNTER — APPOINTMENT (OUTPATIENT)
Dept: SURGERY | Facility: HOSPITAL | Age: 38
End: 2023-04-05

## 2023-04-05 ENCOUNTER — APPOINTMENT (OUTPATIENT)
Dept: PLASTIC SURGERY | Facility: HOSPITAL | Age: 38
End: 2023-04-05

## 2023-04-05 VITALS
DIASTOLIC BLOOD PRESSURE: 82 MMHG | RESPIRATION RATE: 12 BRPM | OXYGEN SATURATION: 98 % | WEIGHT: 251.11 LBS | SYSTOLIC BLOOD PRESSURE: 133 MMHG | TEMPERATURE: 98 F | HEART RATE: 87 BPM | HEIGHT: 63 IN

## 2023-04-05 DIAGNOSIS — L02.216 CUTANEOUS ABSCESS OF UMBILICUS: ICD-10-CM

## 2023-04-05 DIAGNOSIS — Z98.84 BARIATRIC SURGERY STATUS: Chronic | ICD-10-CM

## 2023-04-05 DIAGNOSIS — K43.9 VENTRAL HERNIA WITHOUT OBSTRUCTION OR GANGRENE: ICD-10-CM

## 2023-04-05 LAB
BLD GP AB SCN SERPL QL: SIGNIFICANT CHANGE UP
GLUCOSE BLDC GLUCOMTR-MCNC: 122 MG/DL — HIGH (ref 70–99)
GLUCOSE BLDC GLUCOMTR-MCNC: 171 MG/DL — HIGH (ref 70–99)
GLUCOSE BLDC GLUCOMTR-MCNC: 242 MG/DL — HIGH (ref 70–99)
HCG UR QL: NEGATIVE — SIGNIFICANT CHANGE UP

## 2023-04-05 PROCEDURE — 14000 TIS TRNFR TRUNK 10 SQ CM/<: CPT | Mod: 59

## 2023-04-05 PROCEDURE — 49591 RPR AA HRN 1ST < 3 CM RDC: CPT

## 2023-04-05 PROCEDURE — 88302 TISSUE EXAM BY PATHOLOGIST: CPT | Mod: 26

## 2023-04-05 PROCEDURE — 15830 EXC EXCESSIVE SKIN ABDOMEN: CPT

## 2023-04-05 DEVICE — CLIP APPLIER COVIDIEN SURGICLIP II 9.75" MEDIUM: Type: IMPLANTABLE DEVICE | Status: FUNCTIONAL

## 2023-04-05 DEVICE — VISTASEAL FIBRIN HUMAN 4ML: Type: IMPLANTABLE DEVICE | Status: FUNCTIONAL

## 2023-04-05 RX ORDER — DEXTROSE 50 % IN WATER 50 %
12.5 SYRINGE (ML) INTRAVENOUS ONCE
Refills: 0 | Status: DISCONTINUED | OUTPATIENT
Start: 2023-04-05 | End: 2023-04-06

## 2023-04-05 RX ORDER — ONDANSETRON 8 MG/1
4 TABLET, FILM COATED ORAL ONCE
Refills: 0 | Status: DISCONTINUED | OUTPATIENT
Start: 2023-04-05 | End: 2023-04-05

## 2023-04-05 RX ORDER — CEFAZOLIN SODIUM 1 G
2000 VIAL (EA) INJECTION ONCE
Refills: 0 | Status: COMPLETED | OUTPATIENT
Start: 2023-04-05 | End: 2023-04-05

## 2023-04-05 RX ORDER — ACETAMINOPHEN 500 MG
650 TABLET ORAL EVERY 6 HOURS
Refills: 0 | Status: DISCONTINUED | OUTPATIENT
Start: 2023-04-05 | End: 2023-04-06

## 2023-04-05 RX ORDER — DEXTROSE 50 % IN WATER 50 %
25 SYRINGE (ML) INTRAVENOUS ONCE
Refills: 0 | Status: DISCONTINUED | OUTPATIENT
Start: 2023-04-05 | End: 2023-04-06

## 2023-04-05 RX ORDER — ONDANSETRON 8 MG/1
4 TABLET, FILM COATED ORAL EVERY 4 HOURS
Refills: 0 | Status: DISCONTINUED | OUTPATIENT
Start: 2023-04-05 | End: 2023-04-06

## 2023-04-05 RX ORDER — LEVOTHYROXINE SODIUM 125 MCG
100 TABLET ORAL DAILY
Refills: 0 | Status: DISCONTINUED | OUTPATIENT
Start: 2023-04-05 | End: 2023-04-06

## 2023-04-05 RX ORDER — SODIUM CHLORIDE 9 MG/ML
1000 INJECTION, SOLUTION INTRAVENOUS
Refills: 0 | Status: DISCONTINUED | OUTPATIENT
Start: 2023-04-05 | End: 2023-04-06

## 2023-04-05 RX ORDER — METHYLPHENIDATE HCL 5 MG
54 TABLET ORAL EVERY MORNING
Refills: 0 | Status: DISCONTINUED | OUTPATIENT
Start: 2023-04-05 | End: 2023-04-05

## 2023-04-05 RX ORDER — METHYLPHENIDATE HCL 5 MG
5 TABLET ORAL DAILY
Refills: 0 | Status: DISCONTINUED | OUTPATIENT
Start: 2023-04-05 | End: 2023-04-05

## 2023-04-05 RX ORDER — GLUCAGON INJECTION, SOLUTION 0.5 MG/.1ML
1 INJECTION, SOLUTION SUBCUTANEOUS ONCE
Refills: 0 | Status: DISCONTINUED | OUTPATIENT
Start: 2023-04-05 | End: 2023-04-06

## 2023-04-05 RX ORDER — OXYCODONE HYDROCHLORIDE 5 MG/1
10 TABLET ORAL EVERY 4 HOURS
Refills: 0 | Status: DISCONTINUED | OUTPATIENT
Start: 2023-04-05 | End: 2023-04-06

## 2023-04-05 RX ORDER — HYOSCYAMINE SULFATE 0.13 MG
0.12 TABLET ORAL ONCE
Refills: 0 | Status: DISCONTINUED | OUTPATIENT
Start: 2023-04-05 | End: 2023-04-06

## 2023-04-05 RX ORDER — HYDROMORPHONE HYDROCHLORIDE 2 MG/ML
0.25 INJECTION INTRAMUSCULAR; INTRAVENOUS; SUBCUTANEOUS
Refills: 0 | Status: DISCONTINUED | OUTPATIENT
Start: 2023-04-05 | End: 2023-04-05

## 2023-04-05 RX ORDER — OXYCODONE HYDROCHLORIDE 5 MG/1
5 TABLET ORAL EVERY 4 HOURS
Refills: 0 | Status: DISCONTINUED | OUTPATIENT
Start: 2023-04-05 | End: 2023-04-06

## 2023-04-05 RX ORDER — INSULIN LISPRO 100/ML
VIAL (ML) SUBCUTANEOUS
Refills: 0 | Status: DISCONTINUED | OUTPATIENT
Start: 2023-04-05 | End: 2023-04-06

## 2023-04-05 RX ORDER — SODIUM CHLORIDE 9 MG/ML
1000 INJECTION, SOLUTION INTRAVENOUS
Refills: 0 | Status: DISCONTINUED | OUTPATIENT
Start: 2023-04-05 | End: 2023-04-05

## 2023-04-05 RX ORDER — HYDROMORPHONE HYDROCHLORIDE 2 MG/ML
0.5 INJECTION INTRAMUSCULAR; INTRAVENOUS; SUBCUTANEOUS
Refills: 0 | Status: DISCONTINUED | OUTPATIENT
Start: 2023-04-05 | End: 2023-04-05

## 2023-04-05 RX ORDER — DEXTROSE 50 % IN WATER 50 %
15 SYRINGE (ML) INTRAVENOUS ONCE
Refills: 0 | Status: DISCONTINUED | OUTPATIENT
Start: 2023-04-05 | End: 2023-04-06

## 2023-04-05 RX ORDER — ONDANSETRON 8 MG/1
4 TABLET, FILM COATED ORAL ONCE
Refills: 0 | Status: COMPLETED | OUTPATIENT
Start: 2023-04-05 | End: 2023-04-05

## 2023-04-05 RX ORDER — CHLORHEXIDINE GLUCONATE 213 G/1000ML
1 SOLUTION TOPICAL ONCE
Refills: 0 | Status: COMPLETED | OUTPATIENT
Start: 2023-04-05 | End: 2023-04-05

## 2023-04-05 RX ORDER — ONDANSETRON 8 MG/1
4 TABLET, FILM COATED ORAL EVERY 6 HOURS
Refills: 0 | Status: DISCONTINUED | OUTPATIENT
Start: 2023-04-05 | End: 2023-04-05

## 2023-04-05 RX ORDER — CEFAZOLIN SODIUM 1 G
1000 VIAL (EA) INJECTION EVERY 8 HOURS
Refills: 0 | Status: DISCONTINUED | OUTPATIENT
Start: 2023-04-05 | End: 2023-04-06

## 2023-04-05 RX ADMIN — OXYCODONE HYDROCHLORIDE 5 MILLIGRAM(S): 5 TABLET ORAL at 22:18

## 2023-04-05 RX ADMIN — Medication 100 MILLIGRAM(S): at 17:03

## 2023-04-05 RX ADMIN — SODIUM CHLORIDE 75 MILLILITER(S): 9 INJECTION, SOLUTION INTRAVENOUS at 12:58

## 2023-04-05 RX ADMIN — HYDROMORPHONE HYDROCHLORIDE 0.5 MILLIGRAM(S): 2 INJECTION INTRAMUSCULAR; INTRAVENOUS; SUBCUTANEOUS at 13:30

## 2023-04-05 RX ADMIN — HYDROMORPHONE HYDROCHLORIDE 0.5 MILLIGRAM(S): 2 INJECTION INTRAMUSCULAR; INTRAVENOUS; SUBCUTANEOUS at 13:59

## 2023-04-05 RX ADMIN — CHLORHEXIDINE GLUCONATE 1 APPLICATION(S): 213 SOLUTION TOPICAL at 06:55

## 2023-04-05 RX ADMIN — OXYCODONE HYDROCHLORIDE 5 MILLIGRAM(S): 5 TABLET ORAL at 17:33

## 2023-04-05 RX ADMIN — SODIUM CHLORIDE 60 MILLILITER(S): 9 INJECTION, SOLUTION INTRAVENOUS at 14:56

## 2023-04-05 RX ADMIN — ONDANSETRON 4 MILLIGRAM(S): 8 TABLET, FILM COATED ORAL at 18:06

## 2023-04-05 RX ADMIN — ONDANSETRON 4 MILLIGRAM(S): 8 TABLET, FILM COATED ORAL at 15:23

## 2023-04-05 RX ADMIN — OXYCODONE HYDROCHLORIDE 5 MILLIGRAM(S): 5 TABLET ORAL at 17:03

## 2023-04-05 RX ADMIN — Medication 2: at 17:03

## 2023-04-05 RX ADMIN — ONDANSETRON 4 MILLIGRAM(S): 8 TABLET, FILM COATED ORAL at 22:17

## 2023-04-05 RX ADMIN — SODIUM CHLORIDE 75 MILLILITER(S): 9 INJECTION, SOLUTION INTRAVENOUS at 21:26

## 2023-04-05 RX ADMIN — OXYCODONE HYDROCHLORIDE 5 MILLIGRAM(S): 5 TABLET ORAL at 22:52

## 2023-04-05 NOTE — PRE-OP CHECKLIST - SELECT TESTS ORDERED
BMP/CBC/Type and Screen/UCG/POCT Blood Glucose BMP/CBC/Type and Screen/UCG/POCT Blood Glucose/COVID-19

## 2023-04-05 NOTE — BRIEF OPERATIVE NOTE - COMMENTS
Umbilicus excised due to prior repeated infections with abscesses.  Exposing 1 cm fascial defect- repaired as above with primary closure and no mesh.  Initial exposure via panniculectomy with closure by Plastic Surgery team.

## 2023-04-05 NOTE — BRIEF OPERATIVE NOTE - NSICDXBRIEFPOSTOP_GEN_ALL_CORE_FT
POST-OP DIAGNOSIS:  Umbilical hernia 05-Apr-2023 16:57:08  Sophia Mojica  Abdominal pannus 05-Apr-2023 16:57:14  Sophia Mojica  
POST-OP DIAGNOSIS:  Ventral hernia 05-Apr-2023 20:13:25  Noé Winter  Abscess, umbilical 05-Apr-2023 20:14:06  Noé Winter

## 2023-04-05 NOTE — BRIEF OPERATIVE NOTE - NSICDXBRIEFPREOP_GEN_ALL_CORE_FT
PRE-OP DIAGNOSIS:  Umbilical hernia 05-Apr-2023 16:56:22  Sophia Mojica  Abdominal pannus 05-Apr-2023 16:56:28  Sophia Mojica  
PRE-OP DIAGNOSIS:  Ventral hernia 05-Apr-2023 20:13:09  Noé Winter  Abscess, umbilical 05-Apr-2023 20:14:17  Noé Winter

## 2023-04-05 NOTE — BRIEF OPERATIVE NOTE - NSICDXBRIEFPROCEDURE_GEN_ALL_CORE_FT
PROCEDURES:  Umbilectomy 05-Apr-2023 16:55:47  Sophia Mojica  Abdominal panniculectomy 05-Apr-2023 16:56:00  Sophia Mojica  
PROCEDURES:  Umbilectomy 05-Apr-2023 16:55:47  Sophia Mojica

## 2023-04-06 ENCOUNTER — TRANSCRIPTION ENCOUNTER (OUTPATIENT)
Age: 38
End: 2023-04-06

## 2023-04-06 VITALS
TEMPERATURE: 98 F | DIASTOLIC BLOOD PRESSURE: 75 MMHG | RESPIRATION RATE: 18 BRPM | OXYGEN SATURATION: 95 % | HEART RATE: 97 BPM | SYSTOLIC BLOOD PRESSURE: 131 MMHG

## 2023-04-06 DIAGNOSIS — E11.9 TYPE 2 DIABETES MELLITUS WITHOUT COMPLICATIONS: ICD-10-CM

## 2023-04-06 LAB
ANION GAP SERPL CALC-SCNC: 6 MMOL/L — SIGNIFICANT CHANGE UP (ref 5–17)
BASOPHILS # BLD AUTO: 0.05 K/UL — SIGNIFICANT CHANGE UP (ref 0–0.2)
BASOPHILS NFR BLD AUTO: 0.4 % — SIGNIFICANT CHANGE UP (ref 0–2)
BUN SERPL-MCNC: 11 MG/DL — SIGNIFICANT CHANGE UP (ref 7–23)
CALCIUM SERPL-MCNC: 8.5 MG/DL — SIGNIFICANT CHANGE UP (ref 8.4–10.5)
CHLORIDE SERPL-SCNC: 104 MMOL/L — SIGNIFICANT CHANGE UP (ref 96–108)
CO2 SERPL-SCNC: 29 MMOL/L — SIGNIFICANT CHANGE UP (ref 22–31)
CREAT SERPL-MCNC: 0.6 MG/DL — SIGNIFICANT CHANGE UP (ref 0.5–1.3)
EGFR: 118 ML/MIN/1.73M2 — SIGNIFICANT CHANGE UP
EOSINOPHIL # BLD AUTO: 0.02 K/UL — SIGNIFICANT CHANGE UP (ref 0–0.5)
EOSINOPHIL NFR BLD AUTO: 0.2 % — SIGNIFICANT CHANGE UP (ref 0–6)
GLUCOSE BLDC GLUCOMTR-MCNC: 126 MG/DL — HIGH (ref 70–99)
GLUCOSE BLDC GLUCOMTR-MCNC: 129 MG/DL — HIGH (ref 70–99)
GLUCOSE SERPL-MCNC: 134 MG/DL — HIGH (ref 70–99)
HCT VFR BLD CALC: 36.2 % — SIGNIFICANT CHANGE UP (ref 34.5–45)
HGB BLD-MCNC: 11.5 G/DL — SIGNIFICANT CHANGE UP (ref 11.5–15.5)
IMM GRANULOCYTES NFR BLD AUTO: 0.3 % — SIGNIFICANT CHANGE UP (ref 0–0.9)
LYMPHOCYTES # BLD AUTO: 3.83 K/UL — HIGH (ref 1–3.3)
LYMPHOCYTES # BLD AUTO: 30.3 % — SIGNIFICANT CHANGE UP (ref 13–44)
MCHC RBC-ENTMCNC: 25.4 PG — LOW (ref 27–34)
MCHC RBC-ENTMCNC: 31.8 GM/DL — LOW (ref 32–36)
MCV RBC AUTO: 80.1 FL — SIGNIFICANT CHANGE UP (ref 80–100)
MONOCYTES # BLD AUTO: 0.84 K/UL — SIGNIFICANT CHANGE UP (ref 0–0.9)
MONOCYTES NFR BLD AUTO: 6.7 % — SIGNIFICANT CHANGE UP (ref 2–14)
NEUTROPHILS # BLD AUTO: 7.85 K/UL — HIGH (ref 1.8–7.4)
NEUTROPHILS NFR BLD AUTO: 62.1 % — SIGNIFICANT CHANGE UP (ref 43–77)
NRBC # BLD: 0 /100 WBCS — SIGNIFICANT CHANGE UP (ref 0–0)
PLATELET # BLD AUTO: 254 K/UL — SIGNIFICANT CHANGE UP (ref 150–400)
POTASSIUM SERPL-MCNC: 3.6 MMOL/L — SIGNIFICANT CHANGE UP (ref 3.5–5.3)
POTASSIUM SERPL-SCNC: 3.6 MMOL/L — SIGNIFICANT CHANGE UP (ref 3.5–5.3)
RBC # BLD: 4.52 M/UL — SIGNIFICANT CHANGE UP (ref 3.8–5.2)
RBC # FLD: 14.2 % — SIGNIFICANT CHANGE UP (ref 10.3–14.5)
SODIUM SERPL-SCNC: 139 MMOL/L — SIGNIFICANT CHANGE UP (ref 135–145)
WBC # BLD: 12.63 K/UL — HIGH (ref 3.8–10.5)
WBC # FLD AUTO: 12.63 K/UL — HIGH (ref 3.8–10.5)

## 2023-04-06 PROCEDURE — 85025 COMPLETE CBC W/AUTO DIFF WBC: CPT

## 2023-04-06 PROCEDURE — 86901 BLOOD TYPING SEROLOGIC RH(D): CPT

## 2023-04-06 PROCEDURE — 80048 BASIC METABOLIC PNL TOTAL CA: CPT

## 2023-04-06 PROCEDURE — 99221 1ST HOSP IP/OBS SF/LOW 40: CPT

## 2023-04-06 PROCEDURE — 94664 DEMO&/EVAL PT USE INHALER: CPT

## 2023-04-06 PROCEDURE — 82962 GLUCOSE BLOOD TEST: CPT

## 2023-04-06 PROCEDURE — 86900 BLOOD TYPING SEROLOGIC ABO: CPT

## 2023-04-06 PROCEDURE — 86850 RBC ANTIBODY SCREEN: CPT

## 2023-04-06 PROCEDURE — C1889: CPT

## 2023-04-06 PROCEDURE — 81025 URINE PREGNANCY TEST: CPT

## 2023-04-06 PROCEDURE — 88302 TISSUE EXAM BY PATHOLOGIST: CPT

## 2023-04-06 PROCEDURE — 36415 COLL VENOUS BLD VENIPUNCTURE: CPT

## 2023-04-06 RX ORDER — ONDANSETRON 8 MG/1
4 TABLET, FILM COATED ORAL ONCE
Refills: 0 | Status: COMPLETED | OUTPATIENT
Start: 2023-04-06 | End: 2023-04-06

## 2023-04-06 RX ORDER — BENZOCAINE AND MENTHOL 5; 1 G/100ML; G/100ML
1 LIQUID ORAL ONCE
Refills: 0 | Status: COMPLETED | OUTPATIENT
Start: 2023-04-06 | End: 2023-04-06

## 2023-04-06 RX ADMIN — Medication 100 MICROGRAM(S): at 05:51

## 2023-04-06 RX ADMIN — OXYCODONE HYDROCHLORIDE 5 MILLIGRAM(S): 5 TABLET ORAL at 10:00

## 2023-04-06 RX ADMIN — OXYCODONE HYDROCHLORIDE 5 MILLIGRAM(S): 5 TABLET ORAL at 09:15

## 2023-04-06 RX ADMIN — OXYCODONE HYDROCHLORIDE 5 MILLIGRAM(S): 5 TABLET ORAL at 13:50

## 2023-04-06 RX ADMIN — ONDANSETRON 4 MILLIGRAM(S): 8 TABLET, FILM COATED ORAL at 04:45

## 2023-04-06 RX ADMIN — OXYCODONE HYDROCHLORIDE 5 MILLIGRAM(S): 5 TABLET ORAL at 05:33

## 2023-04-06 RX ADMIN — OXYCODONE HYDROCHLORIDE 5 MILLIGRAM(S): 5 TABLET ORAL at 04:48

## 2023-04-06 RX ADMIN — OXYCODONE HYDROCHLORIDE 5 MILLIGRAM(S): 5 TABLET ORAL at 13:18

## 2023-04-06 RX ADMIN — Medication 100 MILLIGRAM(S): at 00:12

## 2023-04-06 RX ADMIN — Medication 100 MILLIGRAM(S): at 08:04

## 2023-04-06 RX ADMIN — ONDANSETRON 4 MILLIGRAM(S): 8 TABLET, FILM COATED ORAL at 14:36

## 2023-04-06 RX ADMIN — ONDANSETRON 4 MILLIGRAM(S): 8 TABLET, FILM COATED ORAL at 08:03

## 2023-04-06 RX ADMIN — BENZOCAINE AND MENTHOL 1 LOZENGE: 5; 1 LIQUID ORAL at 12:03

## 2023-04-06 NOTE — DISCHARGE NOTE PROVIDER - CARE PROVIDERS DIRECT ADDRESSES
,DirectAddress_Unknown,[email or web address]

## 2023-04-06 NOTE — CARE COORDINATION ASSESSMENT. - NSDCPLANSERVICES_GEN_ALL_CORE
Pt  POD#1 s/p umbilical/ventral hernia repair, umbilectomy, abdominal panniculectomy, and umbilicoplasty.    CM met with patient at bedside and explained role through out hospital stay and dc planning, patient stated understanding. Patient lives alone in a private house with 3 steps to enter. Prior to surgery patient was ind in adls including driving. Denies any services or equipment at home. 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 Transition of care discussed with patient and plan is to go home when medically cleared with help from her father. Patient brought up concerns managing with drains at her home and feels she needs help with bathing the first week at home, stated that Dr Winter suggested having homecare follow up at home. Transition of care folder with list of certified hc agencies provided to the patient.  Patient chose [facility] at home. Referral will sent accordingly.   HC expectations, guidelines, criteria explained to the patient w/ good understanding.  Anticipated dc home 4/6/2023. Patients father will be available to drive patient home once medically stable.  CM will cont to be available.  PCP: DR Harding   Pharmacy [address] [place] [address] [phone]/Home Care

## 2023-04-06 NOTE — PROGRESS NOTE ADULT - ASSESSMENT
POD#1 s/p umbilical/ventral hernia repair, umbilectomy, abdominal panniculectomy, and umbilicoplasty. Recovering well.     -advance diet a tolerated  -d/c IVF  -OOB, ambulate  -IS, venodynes while in bed  -pain control  -drain care and teaching  -continue abdominal binder  -anticipate discharge to home today  -follow up next week  -prescriptions sent from office  -call with additional questions or concerns [phone]

## 2023-04-06 NOTE — CARE COORDINATION ASSESSMENT. - NSPASTMEDSURGHISTORY_GEN_ALL_CORE_FT
PAST MEDICAL & SURGICAL HISTORY:  Fracture of clavicle  at age 4.      Closed fracture of wrist  left wrist at age 6      History of wisdom tooth extraction  2008      H/O laparoscopic adjustable gastric banding  2012      Hashimoto's disease      Umbilical hernia      DM (diabetes mellitus)      ADHD      History of ventral hernia      Hypothyroidism      History of removal of laparoscopic gastric banding device

## 2023-04-06 NOTE — PROGRESS NOTE ADULT - SUBJECTIVE AND OBJECTIVE BOX
Patient seen and evaluated at bedside. She is POD#1 s/p umbilical/ventral hernia repair, umbilectomy, abdominal panniculectomy, and umbilicoplasty. She is recovering well. C/o episode of nausea overnight, which she attributes to pain medication. Otherwise well.     Vital Signs Last 24 Hrs  T(C): 36.8 (06 Apr 2023 03:30), Max: 37.4 (05 Apr 2023 12:17)  T(F): 98.3 (06 Apr 2023 03:30), Max: 99.3 (05 Apr 2023 12:17)  HR: 82 (06 Apr 2023 03:30) (69 - 104)  BP: 113/81 (06 Apr 2023 03:30) (98/67 - 130/69)  BP(mean): --  RR: 16 (06 Apr 2023 03:30) (12 - 19)  SpO2: 95% (06 Apr 2023 03:30) (94% - 97%)    Parameters below as of 05 Apr 2023 14:51  Patient On (Oxygen Delivery Method): room air    I&O's Detail    05 Apr 2023 07:01  -  06 Apr 2023 06:39  --------------------------------------------------------  IN:    Lactated Ringers: 200 mL    Lactated Ringers: 1800 mL    Oral Fluid: 240 mL  Total IN: 2240 mL    OUT:    Blood Loss (mL): 100 mL    Bulb (mL): 50 mL    Bulb (mL): 70 mL    Voided (mL): 1250 mL  Total OUT: 1470 mL    Total NET: 770 mL    Exam:  Abdomen is soft, mildly tender.   No signs of bleeding.   Drains are serosanguinous.   Binder in place.

## 2023-04-06 NOTE — CAREGIVER ENGAGEMENT NOTE - CAREGIVER OUTREACH METHOD
Face to face Pt presents to clinic today with mom for 17 year well child      FOOD SECURITY SCREENING QUESTIONS:    The next two questions are to help us understand your food security.  If you are feeling you need any assistance in this area, we have resources available to support you today.    Hunger Vital Signs:  Within the past 12 months we worried whether our food would run out before we got money to buy more. Never  Within the past 12 months the food we bought just didn't last and we didn't have money to get more. Never            Medication Reconciliation: complete  Chloe Padilla LPN,FAISAL on 7/21/2022 at 4:17 PM

## 2023-04-06 NOTE — DISCHARGE NOTE PROVIDER - NSDCFUADDAPPT_GEN_ALL_CORE_FT
Please call Dr. Dawson's office to schedule a follow-up appointment to be seen in 1 week.  Please make follow up appointment with Dr. Winter once cleared by Dr. Dawson

## 2023-04-06 NOTE — DISCHARGE NOTE PROVIDER - NSDCFUADDINST_GEN_ALL_CORE_FT
If taking narcotic pain medications, may take COLACE (OTC stool softener) as directed to prevent constipation.  Increase ambulation and use incentive spirometer as directed.  Please take medications that you received from Dr. Dawson's office as directed.   *** Drain care: Empty and record EugeniaMarin (HILARIO) drainage output as instructed 2-3 x a day.***  ***Wound care: keep incisions clean and dry. Leave steri strips in place. Keep abdominal binder on as much as possible***

## 2023-04-06 NOTE — DISCHARGE NOTE PROVIDER - CARE PROVIDER_API CALL
ShikowitzBehr, Lauren B (MD)  Plastic Surgery  [address] [place] [id number]  Phone: [phone]  Fax: [phone]  Follow Up Time:     Noé Winter)  Surgery  [address] [place] [id number]  Phone: [phone]  Fax: [phone]  Follow Up Time:

## 2023-04-06 NOTE — DISCHARGE NOTE PROVIDER - NSDCCPTREATMENT_GEN_ALL_CORE_FT
PRINCIPAL PROCEDURE  Procedure: Abdominoplasty, with ventral hernia repair  Findings and Treatment: umbilical hernia repair and umbilectomy

## 2023-04-06 NOTE — DISCHARGE NOTE PROVIDER - NSDCHHASSISTOTHER_GEN_ALL_CORE_FT
Pt does not have restrictions on leaving home. Do not need assistance to leave. Pt is not bedbound.

## 2023-04-06 NOTE — DISCHARGE NOTE PROVIDER - NSDCCPCAREPLAN_GEN_ALL_CORE_FT
PRINCIPAL DISCHARGE DIAGNOSIS  Diagnosis: Umbilical hernia  Assessment and Plan of Treatment:       SECONDARY DISCHARGE DIAGNOSES  Diagnosis: Abdominal pannus  Assessment and Plan of Treatment:

## 2023-04-06 NOTE — CONSULT NOTE ADULT - PROBLEM SELECTOR RECOMMENDATION 9
Type 2 A1c 6.7% adm  FU appt: SUGAR  DSC recommendations: return to home regimen and glucose monitoring  diabetes education provided as documented above  Diabetes support info and cell # [phone] given   Goal 100-180 mg/dL; 140-180 mg/dL in critical care areas

## 2023-04-06 NOTE — PATIENT CHOICE NOTE. - NSPTCHOICESTATE_GEN_ALL_CORE

## 2023-04-06 NOTE — CONSULT NOTE ADULT - ASSESSMENT
Physical Exam:   Vital Signs Last 24 Hrs  T(C): 36.8 (06 Apr 2023 03:30), Max: 37.4 (05 Apr 2023 12:17)  T(F): 98.3 (06 Apr 2023 03:30), Max: 99.3 (05 Apr 2023 12:17)  HR: 82 (06 Apr 2023 03:30) (69 - 104)  BP: 113/81 (06 Apr 2023 03:30) (98/67 - 130/69)  BP(mean): --  RR: 16 (06 Apr 2023 03:30) (12 - 19)  SpO2: 95% (06 Apr 2023 03:30) (94% - 97%)    Parameters below as of 05 Apr 2023 14:51  Patient On (Oxygen Delivery Method): room air        General: NAD, denies Fever, chills  CVS: S1S2 no M/R/G  Resp: CTA in all fields  : no freq, no urgency, no dysuria  Extremeties: no edema, + pulses         CAPILLARY BLOOD GLUCOSE      POCT Blood Glucose.: 129 mg/dL (06 Apr 2023 07:44)  POCT Blood Glucose.: 171 mg/dL (05 Apr 2023 21:21)  POCT Blood Glucose.: 242 mg/dL (05 Apr 2023 17:01)      Cholesterol, Serum: 113 mg/dL (05.19.21 @ 08:36)     HDL Cholesterol, Serum: 22 mg/dL (05.19.21 @ 08:36)     LDL Cholesterol Calculated: 66 mg/dL (05.19.21 @ 08:36)     DIET: CC  >50%

## 2023-04-06 NOTE — DISCHARGE NOTE NURSING/CASE MANAGEMENT/SOCIAL WORK - NSDCPEFALRISK_GEN_ALL_CORE
For information on Fall & Injury Prevention, visit: [email or web address] OR  [email or web address] OR  https://www.cdc.gov/steadi/patient.html

## 2023-04-06 NOTE — CARE COORDINATION ASSESSMENT. - NSCAREPROVIDERS_GEN_ALL_CORE_FT
CARE PROVIDERS:  Admitting: Noé Winter  Attending: Noé Winter  Consultant: Homer King  Consultant: Mag Dunaway  Consultant: Mark Urbina  Nurse: Lynn Clark  Nurse: Nanda Williamson  Ordered: Tasia Anne  Ordered: Physician, Ordering  Override: Mary Ann Wharton  PCA/Nursing Assistant: Mary Lou Mitchell  PCA/Nursing Assistant: Becky Johnson  Primary Team: Raisa Saldana  Primary Team: Sophia Mojica  Primary Team: Desiree Rashid  Primary Team: Melissa Canas  Primary Team: Shikowitz-Behr, Lauren  Primary Team: Niesha Chairez  Primary Team: Jacy Presley  Registered Dietitian: Liss Dale  Technician: Patricia Madrigal// Supp. Assoc.: Josefina Harper

## 2023-04-06 NOTE — DISCHARGE NOTE NURSING/CASE MANAGEMENT/SOCIAL WORK - PATIENT PORTAL LINK FT
You can access the FollowMyHealth Patient Portal offered by [facility] by registering at the following website: [email or web address]. By joining [employer]’s FollowMyHealth portal, you will also be able to view your health information using other applications (apps) compatible with our system.

## 2023-04-06 NOTE — CONSULT NOTE ADULT - SUBJECTIVE AND OBJECTIVE BOX
Patient is a 38y old  Female who presents with a chief complaint of   Type 2 DM DX last year, hx Hashimoto's thyroiditis, no known complications or hx DKA/HSS. managed by Endocrine,  Rx home metformin 500mg TID and steglatro 15mg daily. pt uses freestyle gabriel 3 CGM, changes q14 days, reports readings usually . discussed consistent carb diet, pt usually maintains 1200 Kcal diet, exercise 5x week, lost 40lb last year, reviewed carb identification and portion control, priotitizing protein and non-starchy vegetables. pt reports no needs, verbal education and handouts given  diabetes education provided- A1c measure and BG targets  fasting, <180 2 hours postmeal. medication MOA and considerations/side effects, inhospital BGM frequency and insulin administration, s/s of hyperglycemia/hypoglycemia and management, glycemic control and preventing complications, consistent carb diet, balanced plate method, consistent meal planning. sick day management, provider f/u  Hx hashimotos, obesity    HPI:      PAST MEDICAL & SURGICAL HISTORY:  Closed fracture of wrist  left wrist at age 6      Fracture of clavicle  at age 4.      DM (diabetes mellitus)      Umbilical hernia      Hashimoto's disease      Hypothyroidism      History of ventral hernia      ADHD      History of wisdom tooth extraction  2008      H/O laparoscopic adjustable gastric banding  2012      History of removal of laparoscopic gastric banding device      Allergies    adhesives (Rash)  contrast media (iodine-based) (Rash)  frozen green beans (Hives; Flushing; Angioedema)  Peaches (Urticaria; Flushing; Rhinitis)  peanuts (Hives; Angioedema)    Intolerances        MEDICATIONS  (STANDING):  ceFAZolin   IVPB 1000 milliGRAM(s) IV Intermittent every 8 hours  dextrose 5%. 1000 milliLiter(s) (50 mL/Hr) IV Continuous <Continuous>  dextrose 5%. 1000 milliLiter(s) (100 mL/Hr) IV Continuous <Continuous>  dextrose 50% Injectable 25 Gram(s) IV Push once  dextrose 50% Injectable 12.5 Gram(s) IV Push once  dextrose 50% Injectable 25 Gram(s) IV Push once  dextrose 50% Injectable 25 Gram(s) IV Push once  dextrose 50% Injectable 12.5 Gram(s) IV Push once  dextrose 50% Injectable 25 Gram(s) IV Push once  dextrose Oral Gel 15 Gram(s) Oral once  glucagon  Injectable 1 milliGRAM(s) IntraMuscular once  glucagon  Injectable 1 milliGRAM(s) IntraMuscular once  insulin lispro (ADMELOG) corrective regimen sliding scale   SubCutaneous three times a day before meals  levothyroxine 100 MICROGram(s) Oral daily

## 2023-04-06 NOTE — DISCHARGE NOTE PROVIDER - NSDCMRMEDTOKEN_GEN_ALL_CORE_FT
Concerta 54 mg/24 hr oral tablet, extended release: 1 tab(s) orally once a day (in the morning)  levothyroxine 100 mcg (0.1 mg) oral tablet: 1 tab(s) orally once a day  metFORMIN 500 mg oral tablet: 500 milligram(s) orally 2 times a day  Ritalin 5 mg oral tablet: 5 milligram(s) orally once a day, As Needed  Steglatro 15 mg oral tablet: 1 tab(s) orally once a day (in the morning)

## 2023-04-06 NOTE — DISCHARGE NOTE NURSING/CASE MANAGEMENT/SOCIAL WORK - NSSCNAMETXT_GEN_ALL_CORE
[facility] care agency [phone] will reach out to you within 24-72 hours of your discharge to schedule home care visit/eval appointment with you. Please call agency for any queries regarding home care services

## 2023-04-07 PROBLEM — F90.9 ATTENTION-DEFICIT HYPERACTIVITY DISORDER, UNSPECIFIED TYPE: Chronic | Status: ACTIVE | Noted: 2023-04-05

## 2023-04-10 ENCOUNTER — INPATIENT (INPATIENT)
Facility: HOSPITAL | Age: 38
LOS: 1 days | Discharge: HOME | DRG: 920 | End: 2023-04-12
Attending: SURGERY | Admitting: SURGERY
Payer: MEDICAID

## 2023-04-10 VITALS
DIASTOLIC BLOOD PRESSURE: 88 MMHG | HEART RATE: 120 BPM | SYSTOLIC BLOOD PRESSURE: 125 MMHG | HEIGHT: 63 IN | OXYGEN SATURATION: 99 % | TEMPERATURE: 99 F | RESPIRATION RATE: 22 BRPM

## 2023-04-10 DIAGNOSIS — Z98.84 BARIATRIC SURGERY STATUS: Chronic | ICD-10-CM

## 2023-04-10 DIAGNOSIS — Z98.890 OTHER SPECIFIED POSTPROCEDURAL STATES: Chronic | ICD-10-CM

## 2023-04-10 DIAGNOSIS — E03.9 HYPOTHYROIDISM, UNSPECIFIED: ICD-10-CM

## 2023-04-10 DIAGNOSIS — F90.9 ATTENTION-DEFICIT HYPERACTIVITY DISORDER, UNSPECIFIED TYPE: ICD-10-CM

## 2023-04-10 DIAGNOSIS — E11.9 TYPE 2 DIABETES MELLITUS WITHOUT COMPLICATIONS: ICD-10-CM

## 2023-04-10 DIAGNOSIS — Z29.9 ENCOUNTER FOR PROPHYLACTIC MEASURES, UNSPECIFIED: ICD-10-CM

## 2023-04-10 DIAGNOSIS — R10.9 UNSPECIFIED ABDOMINAL PAIN: ICD-10-CM

## 2023-04-10 PROBLEM — Z87.19 PERSONAL HISTORY OF OTHER DISEASES OF THE DIGESTIVE SYSTEM: Chronic | Status: ACTIVE | Noted: 2023-03-24

## 2023-04-10 LAB
ALBUMIN SERPL ELPH-MCNC: 3 G/DL — LOW (ref 3.3–5)
ALP SERPL-CCNC: 85 U/L — SIGNIFICANT CHANGE UP (ref 40–120)
ALT FLD-CCNC: 42 U/L — SIGNIFICANT CHANGE UP (ref 12–78)
ANION GAP SERPL CALC-SCNC: 3 MMOL/L — LOW (ref 5–17)
APPEARANCE UR: CLEAR — SIGNIFICANT CHANGE UP
AST SERPL-CCNC: 29 U/L — SIGNIFICANT CHANGE UP (ref 15–37)
BASOPHILS # BLD AUTO: 0.02 K/UL — SIGNIFICANT CHANGE UP (ref 0–0.2)
BASOPHILS NFR BLD AUTO: 0.2 % — SIGNIFICANT CHANGE UP (ref 0–2)
BILIRUB SERPL-MCNC: 0.5 MG/DL — SIGNIFICANT CHANGE UP (ref 0.2–1.2)
BILIRUB UR-MCNC: NEGATIVE — SIGNIFICANT CHANGE UP
BUN SERPL-MCNC: 12 MG/DL — SIGNIFICANT CHANGE UP (ref 7–23)
CALCIUM SERPL-MCNC: 8.8 MG/DL — SIGNIFICANT CHANGE UP (ref 8.5–10.1)
CHLORIDE SERPL-SCNC: 103 MMOL/L — SIGNIFICANT CHANGE UP (ref 96–108)
CO2 SERPL-SCNC: 29 MMOL/L — SIGNIFICANT CHANGE UP (ref 22–31)
COLOR SPEC: YELLOW — SIGNIFICANT CHANGE UP
CREAT SERPL-MCNC: 0.56 MG/DL — SIGNIFICANT CHANGE UP (ref 0.5–1.3)
DIFF PNL FLD: ABNORMAL
EGFR: 120 ML/MIN/1.73M2 — SIGNIFICANT CHANGE UP
EOSINOPHIL # BLD AUTO: 0.14 K/UL — SIGNIFICANT CHANGE UP (ref 0–0.5)
EOSINOPHIL NFR BLD AUTO: 1.3 % — SIGNIFICANT CHANGE UP (ref 0–6)
GLUCOSE SERPL-MCNC: 132 MG/DL — HIGH (ref 70–99)
GLUCOSE SERPL-MCNC: 180 MG/DL — HIGH (ref 70–99)
GLUCOSE UR QL: NEGATIVE — SIGNIFICANT CHANGE UP
HCG SERPL-ACNC: <1 MIU/ML — SIGNIFICANT CHANGE UP
HCT VFR BLD CALC: 40.3 % — SIGNIFICANT CHANGE UP (ref 34.5–45)
HGB BLD-MCNC: 12.7 G/DL — SIGNIFICANT CHANGE UP (ref 11.5–15.5)
IMM GRANULOCYTES NFR BLD AUTO: 0.3 % — SIGNIFICANT CHANGE UP (ref 0–0.9)
KETONES UR-MCNC: NEGATIVE — SIGNIFICANT CHANGE UP
LEUKOCYTE ESTERASE UR-ACNC: NEGATIVE — SIGNIFICANT CHANGE UP
LIDOCAIN IGE QN: 53 U/L — LOW (ref 73–393)
LYMPHOCYTES # BLD AUTO: 0.93 K/UL — LOW (ref 1–3.3)
LYMPHOCYTES # BLD AUTO: 8.4 % — LOW (ref 13–44)
MCHC RBC-ENTMCNC: 25.1 PG — LOW (ref 27–34)
MCHC RBC-ENTMCNC: 31.5 GM/DL — LOW (ref 32–36)
MCV RBC AUTO: 79.6 FL — LOW (ref 80–100)
MONOCYTES # BLD AUTO: 0.36 K/UL — SIGNIFICANT CHANGE UP (ref 0–0.9)
MONOCYTES NFR BLD AUTO: 3.3 % — SIGNIFICANT CHANGE UP (ref 2–14)
NEUTROPHILS # BLD AUTO: 9.59 K/UL — HIGH (ref 1.8–7.4)
NEUTROPHILS NFR BLD AUTO: 86.5 % — HIGH (ref 43–77)
NITRITE UR-MCNC: NEGATIVE — SIGNIFICANT CHANGE UP
NRBC # BLD: 0 /100 WBCS — SIGNIFICANT CHANGE UP (ref 0–0)
PH UR: 7 — SIGNIFICANT CHANGE UP (ref 5–8)
PLATELET # BLD AUTO: 304 K/UL — SIGNIFICANT CHANGE UP (ref 150–400)
POTASSIUM SERPL-MCNC: 4 MMOL/L — SIGNIFICANT CHANGE UP (ref 3.5–5.3)
POTASSIUM SERPL-SCNC: 4 MMOL/L — SIGNIFICANT CHANGE UP (ref 3.5–5.3)
PROT SERPL-MCNC: 7.3 G/DL — SIGNIFICANT CHANGE UP (ref 6–8.3)
PROT UR-MCNC: NEGATIVE — SIGNIFICANT CHANGE UP
RBC # BLD: 5.06 M/UL — SIGNIFICANT CHANGE UP (ref 3.8–5.2)
RBC # FLD: 13.8 % — SIGNIFICANT CHANGE UP (ref 10.3–14.5)
SARS-COV-2 RNA SPEC QL NAA+PROBE: SIGNIFICANT CHANGE UP
SODIUM SERPL-SCNC: 135 MMOL/L — SIGNIFICANT CHANGE UP (ref 135–145)
SP GR SPEC: 1 — LOW (ref 1.01–1.02)
UROBILINOGEN FLD QL: NEGATIVE — SIGNIFICANT CHANGE UP
WBC # BLD: 11.07 K/UL — HIGH (ref 3.8–10.5)
WBC # FLD AUTO: 11.07 K/UL — HIGH (ref 3.8–10.5)

## 2023-04-10 PROCEDURE — 99221 1ST HOSP IP/OBS SF/LOW 40: CPT

## 2023-04-10 PROCEDURE — 99285 EMERGENCY DEPT VISIT HI MDM: CPT

## 2023-04-10 PROCEDURE — 74176 CT ABD & PELVIS W/O CONTRAST: CPT | Mod: 26,MA

## 2023-04-10 RX ORDER — LEVOTHYROXINE SODIUM 125 MCG
100 TABLET ORAL DAILY
Refills: 0 | Status: DISCONTINUED | OUTPATIENT
Start: 2023-04-10 | End: 2023-04-12

## 2023-04-10 RX ORDER — SENNA PLUS 8.6 MG/1
2 TABLET ORAL AT BEDTIME
Refills: 0 | Status: DISCONTINUED | OUTPATIENT
Start: 2023-04-10 | End: 2023-04-12

## 2023-04-10 RX ORDER — MORPHINE SULFATE 50 MG/1
4 CAPSULE, EXTENDED RELEASE ORAL ONCE
Refills: 0 | Status: DISCONTINUED | OUTPATIENT
Start: 2023-04-10 | End: 2023-04-10

## 2023-04-10 RX ORDER — DIPHENHYDRAMINE HCL 50 MG
50 CAPSULE ORAL ONCE
Refills: 0 | Status: DISCONTINUED | OUTPATIENT
Start: 2023-04-10 | End: 2023-04-10

## 2023-04-10 RX ORDER — DEXTROSE 50 % IN WATER 50 %
25 SYRINGE (ML) INTRAVENOUS ONCE
Refills: 0 | Status: DISCONTINUED | OUTPATIENT
Start: 2023-04-10 | End: 2023-04-12

## 2023-04-10 RX ORDER — METHYLPHENIDATE HCL 5 MG
54 TABLET ORAL EVERY MORNING
Refills: 0 | Status: DISCONTINUED | OUTPATIENT
Start: 2023-04-10 | End: 2023-04-10

## 2023-04-10 RX ORDER — SODIUM CHLORIDE 9 MG/ML
1000 INJECTION INTRAMUSCULAR; INTRAVENOUS; SUBCUTANEOUS ONCE
Refills: 0 | Status: COMPLETED | OUTPATIENT
Start: 2023-04-10 | End: 2023-04-10

## 2023-04-10 RX ORDER — SODIUM CHLORIDE 9 MG/ML
1000 INJECTION, SOLUTION INTRAVENOUS
Refills: 0 | Status: DISCONTINUED | OUTPATIENT
Start: 2023-04-10 | End: 2023-04-12

## 2023-04-10 RX ORDER — KETOROLAC TROMETHAMINE 30 MG/ML
30 SYRINGE (ML) INJECTION EVERY 6 HOURS
Refills: 0 | Status: DISCONTINUED | OUTPATIENT
Start: 2023-04-10 | End: 2023-04-11

## 2023-04-10 RX ORDER — ONDANSETRON 8 MG/1
4 TABLET, FILM COATED ORAL EVERY 8 HOURS
Refills: 0 | Status: DISCONTINUED | OUTPATIENT
Start: 2023-04-10 | End: 2023-04-10

## 2023-04-10 RX ORDER — ACETAMINOPHEN 500 MG
1000 TABLET ORAL EVERY 6 HOURS
Refills: 0 | Status: COMPLETED | OUTPATIENT
Start: 2023-04-10 | End: 2023-04-11

## 2023-04-10 RX ORDER — SODIUM CHLORIDE 9 MG/ML
1000 INJECTION, SOLUTION INTRAVENOUS
Refills: 0 | Status: DISCONTINUED | OUTPATIENT
Start: 2023-04-10 | End: 2023-04-11

## 2023-04-10 RX ORDER — ONDANSETRON 8 MG/1
4 TABLET, FILM COATED ORAL EVERY 4 HOURS
Refills: 0 | Status: DISCONTINUED | OUTPATIENT
Start: 2023-04-10 | End: 2023-04-12

## 2023-04-10 RX ORDER — PANTOPRAZOLE SODIUM 20 MG/1
40 TABLET, DELAYED RELEASE ORAL DAILY
Refills: 0 | Status: DISCONTINUED | OUTPATIENT
Start: 2023-04-10 | End: 2023-04-12

## 2023-04-10 RX ORDER — ENOXAPARIN SODIUM 100 MG/ML
40 INJECTION SUBCUTANEOUS ONCE
Refills: 0 | Status: COMPLETED | OUTPATIENT
Start: 2023-04-10 | End: 2023-04-10

## 2023-04-10 RX ORDER — MAGNESIUM HYDROXIDE 400 MG/1
30 TABLET, CHEWABLE ORAL DAILY
Refills: 0 | Status: DISCONTINUED | OUTPATIENT
Start: 2023-04-10 | End: 2023-04-12

## 2023-04-10 RX ORDER — DEXTROSE 50 % IN WATER 50 %
12.5 SYRINGE (ML) INTRAVENOUS ONCE
Refills: 0 | Status: DISCONTINUED | OUTPATIENT
Start: 2023-04-10 | End: 2023-04-12

## 2023-04-10 RX ORDER — GLUCAGON INJECTION, SOLUTION 0.5 MG/.1ML
1 INJECTION, SOLUTION SUBCUTANEOUS ONCE
Refills: 0 | Status: DISCONTINUED | OUTPATIENT
Start: 2023-04-10 | End: 2023-04-12

## 2023-04-10 RX ORDER — DIPHENHYDRAMINE HCL 50 MG
50 CAPSULE ORAL ONCE
Refills: 0 | Status: COMPLETED | OUTPATIENT
Start: 2023-04-10 | End: 2023-04-10

## 2023-04-10 RX ORDER — MINERAL OIL
133 OIL (ML) MISCELLANEOUS ONCE
Refills: 0 | Status: COMPLETED | OUTPATIENT
Start: 2023-04-10 | End: 2023-04-10

## 2023-04-10 RX ORDER — DEXTROSE 50 % IN WATER 50 %
15 SYRINGE (ML) INTRAVENOUS ONCE
Refills: 0 | Status: DISCONTINUED | OUTPATIENT
Start: 2023-04-10 | End: 2023-04-12

## 2023-04-10 RX ORDER — INSULIN LISPRO 100/ML
VIAL (ML) SUBCUTANEOUS EVERY 6 HOURS
Refills: 0 | Status: DISCONTINUED | OUTPATIENT
Start: 2023-04-10 | End: 2023-04-11

## 2023-04-10 RX ORDER — IOHEXOL 300 MG/ML
30 INJECTION, SOLUTION INTRAVENOUS ONCE
Refills: 0 | Status: COMPLETED | OUTPATIENT
Start: 2023-04-10 | End: 2023-04-10

## 2023-04-10 RX ORDER — ONDANSETRON 8 MG/1
4 TABLET, FILM COATED ORAL ONCE
Refills: 0 | Status: COMPLETED | OUTPATIENT
Start: 2023-04-10 | End: 2023-04-10

## 2023-04-10 RX ADMIN — ONDANSETRON 4 MILLIGRAM(S): 8 TABLET, FILM COATED ORAL at 17:57

## 2023-04-10 RX ADMIN — Medication 400 MILLIGRAM(S): at 17:57

## 2023-04-10 RX ADMIN — MORPHINE SULFATE 4 MILLIGRAM(S): 50 CAPSULE, EXTENDED RELEASE ORAL at 06:48

## 2023-04-10 RX ADMIN — ONDANSETRON 4 MILLIGRAM(S): 8 TABLET, FILM COATED ORAL at 05:00

## 2023-04-10 RX ADMIN — PANTOPRAZOLE SODIUM 40 MILLIGRAM(S): 20 TABLET, DELAYED RELEASE ORAL at 11:53

## 2023-04-10 RX ADMIN — Medication 400 MILLIGRAM(S): at 23:58

## 2023-04-10 RX ADMIN — Medication 1000 MILLIGRAM(S): at 12:22

## 2023-04-10 RX ADMIN — SODIUM CHLORIDE 80 MILLILITER(S): 9 INJECTION, SOLUTION INTRAVENOUS at 11:49

## 2023-04-10 RX ADMIN — SODIUM CHLORIDE 1000 MILLILITER(S): 9 INJECTION INTRAMUSCULAR; INTRAVENOUS; SUBCUTANEOUS at 04:59

## 2023-04-10 RX ADMIN — Medication 100 MICROGRAM(S): at 10:54

## 2023-04-10 RX ADMIN — Medication 30 MILLIGRAM(S): at 20:37

## 2023-04-10 RX ADMIN — Medication 400 MILLIGRAM(S): at 11:45

## 2023-04-10 RX ADMIN — SENNA PLUS 2 TABLET(S): 8.6 TABLET ORAL at 21:51

## 2023-04-10 RX ADMIN — Medication 30 MILLIGRAM(S): at 13:02

## 2023-04-10 RX ADMIN — Medication 30 MILLIGRAM(S): at 20:24

## 2023-04-10 RX ADMIN — ENOXAPARIN SODIUM 40 MILLIGRAM(S): 100 INJECTION SUBCUTANEOUS at 17:57

## 2023-04-10 RX ADMIN — IOHEXOL 30 MILLILITER(S): 300 INJECTION, SOLUTION INTRAVENOUS at 06:30

## 2023-04-10 RX ADMIN — MORPHINE SULFATE 4 MILLIGRAM(S): 50 CAPSULE, EXTENDED RELEASE ORAL at 04:59

## 2023-04-10 RX ADMIN — Medication 10 MILLIGRAM(S): at 23:09

## 2023-04-10 RX ADMIN — Medication 50 MILLIGRAM(S): at 06:30

## 2023-04-10 RX ADMIN — Medication 133 MILLILITER(S): at 10:55

## 2023-04-10 RX ADMIN — Medication 30 MILLIGRAM(S): at 17:47

## 2023-04-10 NOTE — ED PROVIDER NOTE - OBJECTIVE STATEMENT
38-year-old female with history of Hashimoto's disease, she is status post umbilical hernia repair with a panniculectomy and embolectomy on April 5, 2023 at [facility] as an outpatient procedure by Dr. Dawson (plastics) and  Dr. Winter (gen surg) who is presenting today for abdominal pain and 5 episodes of nausea and vomiting, nonbloody that started approximately 7:30 PM this evening.  Patient reports her last bowel movement was a day before the procedure and she has not been able to have a bowel movement since then.  She does report she is passing flatus.  Denies any urinary complaints.  Denies fever or chills.  Reports she was on an antibiotic which she is taking for except for today.  She is reports she is taking Percocet and laxatives as prescribed.

## 2023-04-10 NOTE — H&P ADULT - NSICDXPASTMEDICALHX_GEN_ALL_CORE_FT
PAST MEDICAL HISTORY:  ADHD     Closed fracture of wrist left wrist at age 6    DM (diabetes mellitus)     Fracture of clavicle at age 4.    Hashimoto's disease     History of ventral hernia     Hypothyroidism     Umbilical hernia

## 2023-04-10 NOTE — CONSULT NOTE ADULT - PROBLEM/RECOMMENDATION-1
Interval History:  As 12/17/2018 above.     Review of Systems   Constitutional: Negative for appetite change, chills, diaphoresis, fatigue and fever.   HENT: Negative for congestion, sore throat and trouble swallowing.    Eyes: Negative for photophobia, pain and discharge.   Respiratory: Negative for cough, chest tightness and shortness of breath.    Cardiovascular: Negative for chest pain, palpitations and leg swelling.   Gastrointestinal: Negative for abdominal pain, diarrhea, nausea and vomiting.   Genitourinary: Negative for decreased urine volume, difficulty urinating and urgency.   Musculoskeletal: Positive for gait problem. Negative for back pain, myalgias and neck pain.   Skin: Positive for wound. Negative for pallor and rash.   Neurological: Negative for dizziness, weakness, light-headedness, numbness and headaches.     Objective:     Vital Signs (Most Recent):  Temp: 97.7 °F (36.5 °C) (12/17/18 0837)  Pulse: 65 (12/17/18 0837)  Resp: 17 (12/17/18 0837)  BP: (!) 131/56 (12/17/18 0837)  SpO2: 95 % (12/17/18 0837) Vital Signs (24h Range):  Temp:  [97.3 °F (36.3 °C)-97.7 °F (36.5 °C)] 97.7 °F (36.5 °C)  Pulse:  [58-75] 65  Resp:  [17-18] 17  SpO2:  [95 %-96 %] 95 %  BP: (113-131)/(52-62) 131/56     Weight: 104.3 kg (230 lb)  Body mass index is 33.97 kg/m².    Intake/Output Summary (Last 24 hours) at 12/17/2018 1338  Last data filed at 12/17/2018 0600  Gross per 24 hour   Intake 500 ml   Output 1225 ml   Net -725 ml      Physical Exam   Constitutional: He is oriented to person, place, and time. He appears well-developed and well-nourished. No distress.   HENT:   Head: Normocephalic and atraumatic.   Mouth/Throat: Uvula is midline, oropharynx is clear and moist and mucous membranes are normal. Abnormal dentition.   Eyes: Conjunctivae are normal. Pupils are equal, round, and reactive to light.   Neck: No JVD present.   Cardiovascular: Normal rate and normal heart sounds. An irregularly irregular rhythm present.    No murmur heard.  Pulses:       Radial pulses are 2+ on the right side, and 2+ on the left side.        Dorsalis pedis pulses are 1+ on the right side, and 2+ on the left side.   Well-healed sternotomy scar in midline.    Pulmonary/Chest: Effort normal and breath sounds normal. No respiratory distress.   Abdominal: Soft. Normal appearance and bowel sounds are normal. He exhibits no distension. There is no tenderness.   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 No purulent discharge. Dressing clean, dry, intact.    Neurological: He is alert and oriented to person, place, and time.   Skin: Skin is warm and dry. No bruising and no rash noted.       Significant Labs:   CBC:   Recent Labs   Lab 12/17/18  0628   WBC 8.84   HGB 11.8*   HCT 39.2*        CMP:   Recent Labs   Lab 12/17/18  0628      K 4.4      CO2 25   *   BUN 19   CREATININE 1.4   CALCIUM 9.9   ANIONGAP 9   EGFRNONAA 47.4*       Significant Imaging: I have reviewed all pertinent imaging results/findings within the past 24 hours.  I have reviewed and interpreted all pertinent imaging results/findings within the past 24 hours.   DISPLAY PLAN FREE TEXT

## 2023-04-10 NOTE — CONSULT NOTE ADULT - ATTENDING COMMENTS
39 yo female with PMHx of obesity, type 2 diabetes, hypothyroidism, ADHD, now POD#5 umbilical/ventral hernia repair, umbilectomy, abdominal panniculectomy, and umbilicoplasty presents to the ED for nausea and vomiting since 8pm yesterday. Admitted for abdominal pain and constipation.     start low dose sliding scale, accuchecks and hypoglycemia protocol, patient's BG appears controlled and at goal. doubt patient's diabetes is contributing to pt's nausea and vomiting. continue other home medications. would check TSH as pt has history of hypothyroidism. further management per primary team.

## 2023-04-10 NOTE — ED ADULT NURSE NOTE - CHIEF COMPLAINT QUOTE
pt reports she had abdominal surgery on wednesday at [place]. pt states she has been having cramping and vomiting since last night, no BM since tuesday. pt called surgeon on call and was told to come to [place]

## 2023-04-10 NOTE — ED PROVIDER NOTE - CLINICAL SUMMARY MEDICAL DECISION MAKING FREE TEXT BOX
38-year-old female who is postop day 5 status post umbilical hernia repair with panniculectomy and embolectomy on April 5, 2023 presenting with abdominal pain and 5 episodes of nausea vomiting that started at 7:30 PM last night.  No fever.  Passing flatus.  No bowel movement since prior to surgery.  Abdomen is distended but soft with tenderness in the left lower quadrant.  HILARIO drains noted.  Will evaluate for bowel obstruction, ileus, UTI, postop infection.  We will get labs, CT abdomen and pelvis.  Will give IV morphine, Zofran, IV fluids.  Will contact surgical team after CT is resulted.

## 2023-04-10 NOTE — ED ADULT NURSE NOTE - NSIMPLEMENTINTERV_GEN_ALL_ED
Implemented All Fall Risk Interventions:  [place] to call system. Call bell, personal items and telephone within reach. Instruct patient to call for assistance. Room bathroom lighting operational. Non-slip footwear when patient is off stretcher. Physically safe environment: no spills, clutter or unnecessary equipment. Stretcher in lowest position, wheels locked, appropriate side rails in place. Provide visual cue, wrist band, yellow gown, etc. Monitor gait and stability. Monitor for mental status changes and reorient to person, place, and time. Review medications for side effects contributing to fall risk. Reinforce activity limits and safety measures with patient and family.

## 2023-04-10 NOTE — ED ADULT NURSE REASSESSMENT NOTE - NS ED NURSE REASSESS COMMENT FT1
pt medicated with benadryl ivp awaiting ct scan as per order pt with 2 HILARIO in place draining medicated with morphine for pain with some relief

## 2023-04-10 NOTE — CONSULT NOTE ADULT - ASSESSMENT
37 yo female with PMHx of obesity, type 2 diabetes, hypothyroidism, ADHD, now POD#5 umbilical/ventral hernia repair, umbilectomy, abdominal panniculectomy, and umbilicoplasty presents to the ED for nausea and vomiting since 8pm yesterday. Admitted for abdominal pain and constipation.

## 2023-04-10 NOTE — PATIENT PROFILE ADULT - SURGICAL SITE DRAIN
c/o chest discomfort since yesterday. Slightly nauseous this morning. Taking Pepcid and Tums with some relief. Denies short of breath. Denies cough/cold symptoms. Concern about the left side of the face skin protruding. Denies pain.
no

## 2023-04-10 NOTE — H&P ADULT - ASSESSMENT
38yoF with PMHx of obesity, type 2 diabetes, hypothyroidism, ADHD, now POD#5 umbilical/ventral hernia repair, umbilectomy, abdominal panniculectomy, and umbilicoplasty, a/w nausea/vomiting and constipation. Patient states abdominal pain since 7:30-8pm after eating lobster/steak/fries for dinner with multiple episodes of vomiting and nausea. VSS, labs unremarkable, CT scan with large amount of stool in colon, and post surgical changes. Exam unremarkable    PLAN:  - VSS  - Labs unremarkable  - CT scan in the ED with large stool burden and post surgical changes  - Admit to Dr. Winter  - Pain regimen, supportive care  - Pt now s/p enema with small bowel movement  - If no larger BM by this evening Dulcolax suppository    Discussed with Dr. Winter. 38yoF with PMHx of obesity, type 2 diabetes, hypothyroidism, ADHD, now POD#5 umbilical/ventral hernia repair, umbilectomy, abdominal panniculectomy, and umbilicoplasty, a/w nausea/vomiting and constipation. Patient states abdominal pain since 7:30-8pm after eating lobster/steak/fries for dinner with multiple episodes of vomiting and nausea. VSS, labs unremarkable, CT scan with large amount of stool in colon, and post surgical changes. Exam unremarkable    PLAN:  - VSS  - Labs unremarkable  - CT scan in the ED with large stool burden and post surgical changes  - Admit to Dr. Winter  - Pain regimen, supportive care  - Pt now s/p enema with small bowel movement  - If no larger BM by this evening Dulcolax suppository    Discussed with Dr. Winter.    As in above full PA notation.  Ms. Diane personally seen and examined during late am rounds.  "I feel a little better... " though still with nausea and food aversion.  Passing flatus, but no further significant BM.  Notes some incisional discomfort, but denies marj or diffuse abdominal pain to me.  Vitals non-suggestive with exception of mild tachycardia.  Abdomen softly distended, but non tense.  Dressings intact and dry.  HILARIO's secure with light serosanguineous drainage.  Abdomen with appropriate incisional tenderness but no peritoneal findings.  Labs reassuring with only mild leukocytosis.  CT report and images generally reassuring with no obvious acute General Surgery pathology and expected post operative findings.  CT demonstrates fecal retention, though no obvious obstructive process.  Clinically, overall picture c/w post operative nausea and vomiting- may be anesthesia +/- narcotic related.  History and CT also suggestive of constipation, which again is likely multifactorial but secondary to jaden-operative narcotics and immobility, etc.  Surgically, stable for present.  As unable to take meaningful oral intake per her report and observation since arrival, will admit for hydration, pain control, management of symptoms.  Holding diabetes medication for now, but will resume other home medications.  Will ask for Hospitalist assistance with glucose control.  Given oral contrast today which may be enough to facilitate BM.  However, will continue stool softener regimen and add suppository tonight if no response.  Discussed with patient in detail, ER attending, Plastic Surgery attending, Surgical PA's and today's RN team.

## 2023-04-10 NOTE — H&P ADULT - NSHPLABSRESULTS_GEN_ALL_CORE
12.7   11.07 )-----------( 304      ( 10 Apr 2023 04:25 )             40.3   04-10    135  |  103  |  12  ----------------------------<  180<H>  4.0   |  29  |  0.56    Ca    8.8      10 Apr 2023 04:25    TPro  7.3  /  Alb  3.0<L>  /  TBili  0.5  /  DBili  x   /  AST  29  /  ALT  42  /  AlkPhos  85  04-10    < from: CT Abdomen and Pelvis w/ Oral Cont (04.10.23 @ 08:13) >    ACC: [id number] EXAM:  CT ABDOMEN AND PELVIS OC   ORDERED BY:  SONDRA SHEPARD     PROCEDURE DATE:  04/10/2023          INTERPRETATION:  CLINICAL INFORMATION: Status post umbilical hernia   repair/clinic colectomy postop day 5 with abdominal pain and vomiting    COMPARISON: 12/16/2022.    CONTRAST/COMPLICATIONS:  IV Contrast: NONE  0 cc administered   0 cc discarded  Oral Contrast: Omnipaque 300  Complications: None reported at time of study completion    PROCEDURE:  CT of the Abdomen and Pelvis was performed.  Sagittal and coronal reformats were performed.    FINDINGS:  LOWER CHEST: Within normal limits.    LIVER: Steatosis.  BILE DUCTS: Normal caliber.  GALLBLADDER: Within normal limits.  SPLEEN: Within normal limits.  PANCREAS: Within normal limits.  ADRENALS: Within normal limits.  KIDNEYS/URETERS: Left renal cyst with small peripheral.    BLADDER: Within normal limits.  REPRODUCTIVE ORGANS: Uterus and adnexa within normal limits.    BOWEL: Moderate to large amount of stool throughout the colon No bowel   obstruction. Appendix is normal.  PERITONEUM: No ascites.  VESSELS: Within normal limits.  RETROPERITONEUM/LYMPH NODES: Few subcentimeter retroperitoneal nodes.  ABDOMINAL WALL: Partially visualized lipoma in the right anteriorthigh   musculature. Postsurgical changes in the anterior abdominal wall with   stranding and several small foci of subcutaneous air. 2 surgical drains   are noted in the anterior abdominal wall.  BONES: Degenerative changes.    IMPRESSION:  Moderateto large amount of stool throughout the colon. No bowel   obstruction.    Postsurgical changes in the anterior abdominal wall with stranding and   several small foci of subcutaneous air.    Additional findings as above.    --- End of Report ---    VALENCIA GILL MD; Attending Radiologist  This document has been electronically signed. Apr 10 2023  9:07AM    < end of copied text >

## 2023-04-10 NOTE — ED PROVIDER NOTE - PROGRESS NOTE DETAILS
Labs reviewed, unactionable  Pt signed out to Dr. Dejesus to f/u CT and consult surgery. ct resulted surgery paged pa aware ct resulted surgery paged pa aware  copies provided to pt, await surg consult she is requesting an enema for her constipation per surgery pt to be admitted

## 2023-04-10 NOTE — CONSULT NOTE ADULT - PROBLEM SELECTOR RECOMMENDATION 9
Pt presents to ED for abdominal pain with associated nausea and vomiting since last night   - POD#5 umbilical/ventral hernia repair, umbilectomy, abdominal panniculectomy, and umbilicoplasty   - Elicits no BM since prior to surgery last week   - s/p mineral oil enema x1, NS bolus x1, Benadryl 50mg x1, zofran x1 in the ED   - CT abdomen and Pelvis showed Moderate to large amount of stool throughout the colon. No bowel   obstruction. Postsurgical changes in the anterior abdominal wall with stranding and several small foci of subcutaneous air.  - Care per primary surgical team

## 2023-04-10 NOTE — H&P ADULT - HISTORY OF PRESENT ILLNESS
38yoF with PMHx of obesity, type 2 diabetes, hypothyroidism, ADHD, now POD#5 umbilical/ventral hernia repair, umbilectomy, abdominal panniculectomy, and umbilicoplasty, a/w nausea/vomiting and constipation. Patient states abdominal pain since 7:30-8pm after eating lobster/steak/fries for dinner with multiple episodes of vomiting and nausea. Pt states no vomiting since arrival to the ED. As well, pt states no BM since tuesday, prior to surgery. Pt now in the ED s/p enema and PO contrast for CT scan, had small bowel movement now in the ED, still feeling nauseous, Denies fever, chills, sob, chest pain, diarrhea, hematuria, dysuria.

## 2023-04-10 NOTE — ED ADULT NURSE NOTE - OBJECTIVE STATEMENT
received pt stable alert oriented c/o abd pain s/p surgery on wednes[place] pt evaluated and blood work drawned and sent to lab and medicated with morphine and zofran as ordered ivf  infusing well

## 2023-04-10 NOTE — PATIENT PROFILE ADULT - FALL HARM RISK - UNIVERSAL INTERVENTIONS
Bed in lowest position, wheels locked, appropriate side rails in place/Call bell, personal items and telephone in reach/Instruct patient to call for assistance before getting out of bed or chair/Non-slip footwear when patient is out of bed/[place] to call system/Physically safe environment - no spills, clutter or unnecessary equipment/Purposeful Proactive Rounding/Room/bathroom lighting operational, light cord in reach

## 2023-04-10 NOTE — CONSULT NOTE ADULT - SUBJECTIVE AND OBJECTIVE BOX
NICOLA PRYOR  38y  Female      Patient is a 38y old  Female who presents with a chief complaint of nausea vomiting, constipation (10 Apr 2023 13:04)      INTERVAL HP: 37 yo female with PMHx of obesity, type 2 diabetes, hypothyroidism, ADHD, now POD#5 umbilical/ventral hernia repair, umbilectomy, abdominal panniculectomy, and umbilicoplasty presents to the ED for nausea and vomiting since 8pm yesterday. States that she ate some leftover steak/fries yesterday evening and began vomiting 2 hours later. States that she vomited 5 times with decreasing quantity with each progressive episode of emesis. Last episode described to be around 3 AM last night when she decided to come to the ED for further evaluation. Pt additionally states that she has no that she has not had a bowel movement since before her surgery last tuesday. Elicits 6-7/10 generalized abdominal cramping. Pt was given mineral oil enema and contrast dye in ED in preparation for repeat CT. States that she had 1 pebble sized BM in consequence to these measures.         REVIEW OF SYSTEMS:  CONSTITUTIONAL: denies fever, chills, fatigue, weakness  HEENT: denies blurred vision, sore throat  SKIN: denies new lesions, rash  CARDIOVASCULAR: denies chest pain, chest pressure, palpitations  RESPIRATORY: +cough with sputum production (improving) denies shortness of breath  GASTROINTESTINAL: + nausea, constipation, abdominal pain denies nausea, abdominal pain, melena or hematochezia  GENITOURINARY: denies dysuria, discharge  NEUROLOGICAL: +headache, denies numbness, focal weakness  MUSCULOSKELETAL: denies new joint pain, muscle aches  HEMATOLOGIC: denies gross bleeding, bruising    T(C): 36.7 (04-10-23 @ 13:52), Max: 37.2 (04-10-23 @ 03:57)  HR: 101 (04-10-23 @ 13:52) (101 - 120)  BP: 112/68 (04-10-23 @ 13:52) (112/68 - 125/88)  RR: 18 (04-10-23 @ 13:52) (18 - 22)  SpO2: 99% (04-10-23 @ 13:52) (99% - 99%)  Wt(kg): --Vital Signs Last 24 Hrs  T(C): 36.7 (10 Apr 2023 13:52), Max: 37.2 (10 Apr 2023 03:57)  T(F): 98.1 (10 Apr 2023 13:52), Max: 99 (10 Apr 2023 03:57)  HR: 101 (10 Apr 2023 13:52) (101 - 120)  BP: 112/68 (10 Apr 2023 13:52) (112/68 - 125/88)  BP(mean): --  RR: 18 (10 Apr 2023 13:52) (18 - 22)  SpO2: 99% (10 Apr 2023 13:52) (99% - 99%)    Parameters below as of 10 Apr 2023 13:  Patient On (Oxygen Delivery Method): room air        PHYSICAL EXAM:  GENERAL: well-groomed, well-developed, obese, uncomfortable   HEAD:  Atraumatic, Normocephalic  EYES: EOMI, PERRLA, conjunctiva and sclera clear  ENMT: Moist mucous membranes, Good dentition, No lesions  NECK: Supple, No JVD, Normal thyroid  NERVOUS SYSTEM:  Alert & Oriented X3, Good concentration; Motor Strength 5/5 B/L upper and lower extremities  CHEST/LUNG: Clear to percussion bilaterally; No rales, rhonchi, wheezing, or rubs  HEART: Regular rate and rhythm; No murmurs, rubs, or gallops  ABDOMEN: Soft, Bandages in place C/D/I, Drains in place with serosanguinous drainage  EXTREMITIES:  2+ Peripheral Pulses, No clubbing, cyanosis, or edema  LYMPH: No lymphadenopathy noted  SKIN: No rashes or lesions    Consultant(s) Notes Reviewed:  [x ] YES  [ ] NO  Care Discussed with Consultants/Other Providers [ x] YES  [ ] NO    LABS:                        12.7   11.07 )-----------( 304      ( 10 Apr 2023 04:25 )             40.3     04-10    135  |  103  |  12  ----------------------------<  180<H>  4.0   |  29  |  0.56    Ca    8.8      10 Apr 2023 04:25    TPro  7.3  /  Alb  3.0<L>  /  TBili  0.5  /  DBili  x   /  AST  29  /  ALT  42  /  AlkPhos  85  04-10      Urinalysis Basic - ( 10 Apr 2023 12:00 )    Color: Yellow / Appearance: Clear / S.005 / pH: x  Gluc: x / Ketone: Negative  / Bili: Negative / Urobili: Negative   Blood: x / Protein: Negative / Nitrite: Negative   Leuk Esterase: Negative / RBC: 0-2 /HPF / WBC 0-2   Sq Epi: x / Non Sq Epi: x / Bacteria: Occasional      CAPILLARY BLOOD GLUCOSE            Urinalysis Basic - ( 10 Apr 2023 12:00 )    Color: Yellow / Appearance: Clear / S.005 / pH: x  Gluc: x / Ketone: Negative  / Bili: Negative / Urobili: Negative   Blood: x / Protein: Negative / Nitrite: Negative   Leuk Esterase: Negative / RBC: 0-2 /HPF / WBC 0-2   Sq Epi: x / Non Sq Epi: x / Bacteria: Occasional        RADIOLOGY & ADDITIONAL TESTS:  < from: CT Abdomen and Pelvis w/ Oral Cont (04.10.23 @ 08:13) >    ACC: [id number] EXAM:  CT ABDOMEN AND PELVIS OC   ORDERED BY:  SONDRA SHEPARD     PROCEDURE DATE:  04/10/2023          INTERPRETATION:  CLINICAL INFORMATION: Status post umbilical hernia   repair/clinic colectomy postop day 5 with abdominal pain and vomiting    COMPARISON: 2022.    CONTRAST/COMPLICATIONS:  IV Contrast: NONE  0 cc administered   0 cc discarded  Oral Contrast: Omnipaque 300  Complications: None reported at time of study completion    PROCEDURE:  CT of the Abdomen and Pelvis was performed.  Sagittal and coronal reformats were performed.    FINDINGS:  LOWER CHEST: Within normal limits.    LIVER: Steatosis.  BILE DUCTS: Normal caliber.  GALLBLADDER: Within normal limits.  SPLEEN: Within normal limits.  PANCREAS: Within normal limits.  ADRENALS: Within normal limits.  KIDNEYS/URETERS: Left renal cyst with small peripheral.    BLADDER: Within normal limits.  REPRODUCTIVE ORGANS: Uterus and adnexa within normal limits.    BOWEL: Moderate to large amount of stool throughout the colon No bowel   obstruction. Appendix is normal.  PERITONEUM: No ascites.  VESSELS: Within normal limits.  RETROPERITONEUM/LYMPH NODES: Few subcentimeter retroperitoneal nodes.  ABDOMINAL WALL: Partially visualized lipoma in the right anteriorthigh   musculature. Postsurgical changes in the anterior abdominal wall with   stranding and several small foci of subcutaneous air. 2 surgical drains   are noted in the anterior abdominal wall.  BONES: Degenerative changes.    IMPRESSION:  Moderate to large amount of stool throughout the colon. No bowel   obstruction.    Postsurgical changes in the anterior abdominal wall with stranding and   several small foci of subcutaneous air.    Additional findings as above.    --- End of Report ---            VALENCIA GILL MD; Attending Radiologist  This document has been electronically signed. Apr 10 2023  9:07AM    < end of copied text >      Imaging Personally Reviewed:  [ ] YES  [ ] NO NICOLA PRYOR  38y  Female      Patient is a 38y old  Female who presents with a chief complaint of nausea vomiting, constipation (10 Apr 2023 13:04)      INTERVAL HP: 37 yo female with PMHx of obesity, type 2 diabetes, hypothyroidism, ADHD, now POD#5 umbilical/ventral hernia repair, umbilectomy, abdominal panniculectomy, and umbilicoplasty presents to the ED for nausea and vomiting since 8pm yesterday. States that she ate some leftover steak/fries yesterday evening and began vomiting 2 hours later. States that she vomited 5 times with decreasing quantity with each progressive episode of emesis. Last episode described to be around 3 AM last night when she decided to come to the ED for further evaluation. Pt additionally states that she has no that she has not had a bowel movement since before her surgery last tuesday. Elicits 6-7/10 generalized abdominal cramping. Pt was given mineral oil enema and contrast dye in ED in preparation for repeat CT. States that she had 1 pebble sized BM in consequence to these measures.         REVIEW OF SYSTEMS:  CONSTITUTIONAL: denies fever, chills, fatigue, weakness  HEENT: denies blurred vision, sore throat  SKIN: denies new lesions, rash  CARDIOVASCULAR: denies chest pain, chest pressure, palpitations  RESPIRATORY: +cough with sputum production (improving) denies shortness of breath  GASTROINTESTINAL: + nausea, constipation, abdominal pain denies nausea, abdominal pain, melena or hematochezia  GENITOURINARY: denies dysuria, discharge  NEUROLOGICAL: +headache, denies numbness, focal weakness  MUSCULOSKELETAL: denies new joint pain, muscle aches  HEMATOLOGIC: denies gross bleeding, bruising    T(C): 36.7 (04-10-23 @ 13:52), Max: 37.2 (04-10-23 @ 03:57)  HR: 101 (04-10-23 @ 13:52) (101 - 120)  BP: 112/68 (04-10-23 @ 13:52) (112/68 - 125/88)  RR: 18 (04-10-23 @ 13:52) (18 - 22)  SpO2: 99% (04-10-23 @ 13:52) (99% - 99%)  Wt(kg): --Vital Signs Last 24 Hrs  T(C): 36.7 (10 Apr 2023 13:52), Max: 37.2 (10 Apr 2023 03:57)  T(F): 98.1 (10 Apr 2023 13:52), Max: 99 (10 Apr 2023 03:57)  HR: 101 (10 Apr 2023 13:52) (101 - 120)  BP: 112/68 (10 Apr 2023 13:52) (112/68 - 125/88)  BP(mean): --  RR: 18 (10 Apr 2023 13:52) (18 - 22)  SpO2: 99% (10 Apr 2023 13:52) (99% - 99%)    Parameters below as of 10 Apr 2023 13:  Patient On (Oxygen Delivery Method): room air        PHYSICAL EXAM:  GENERAL: well-groomed, well-developed, obese, uncomfortable   HEAD:  Atraumatic, Normocephalic  EYES: EOMI, PERRLA, conjunctiva and sclera clear  ENMT: Moist mucous membranes, Good dentition, No lesions  NECK: Supple, No JVD, Normal thyroid  NERVOUS SYSTEM:  Alert & Oriented X3, Good concentration; Motor Strength 5/5 B/L upper and lower extremities  CHEST/LUNG: Clear to auscultation bilaterally; No rales, rhonchi, wheezing, or rubs  HEART: Regular rate and rhythm; No murmurs, rubs, or gallops  ABDOMEN: Soft, Bandages in place C/D/I, Drains in place with serosanguinous drainage  EXTREMITIES:  2+ Peripheral Pulses, No clubbing, cyanosis, or edema  LYMPH: No lymphadenopathy noted  SKIN: No rashes or lesions    Consultant(s) Notes Reviewed:  [x ] YES  [ ] NO  Care Discussed with Consultants/Other Providers [ x] YES  [ ] NO    LABS:                        12.7   11.07 )-----------( 304      ( 10 Apr 2023 04:25 )             40.3     04-10    135  |  103  |  12  ----------------------------<  180<H>  4.0   |  29  |  0.56    Ca    8.8      10 Apr 2023 04:25    TPro  7.3  /  Alb  3.0<L>  /  TBili  0.5  /  DBili  x   /  AST  29  /  ALT  42  /  AlkPhos  85  04-10      Urinalysis Basic - ( 10 Apr 2023 12:00 )    Color: Yellow / Appearance: Clear / S.005 / pH: x  Gluc: x / Ketone: Negative  / Bili: Negative / Urobili: Negative   Blood: x / Protein: Negative / Nitrite: Negative   Leuk Esterase: Negative / RBC: 0-2 /HPF / WBC 0-2   Sq Epi: x / Non Sq Epi: x / Bacteria: Occasional      CAPILLARY BLOOD GLUCOSE            Urinalysis Basic - ( 10 Apr 2023 12:00 )    Color: Yellow / Appearance: Clear / S.005 / pH: x  Gluc: x / Ketone: Negative  / Bili: Negative / Urobili: Negative   Blood: x / Protein: Negative / Nitrite: Negative   Leuk Esterase: Negative / RBC: 0-2 /HPF / WBC 0-2   Sq Epi: x / Non Sq Epi: x / Bacteria: Occasional        RADIOLOGY & ADDITIONAL TESTS:  < from: CT Abdomen and Pelvis w/ Oral Cont (04.10.23 @ 08:13) >    ACC: [id number] EXAM:  CT ABDOMEN AND PELVIS OC   ORDERED BY:  SONDRA SHEPARD     PROCEDURE DATE:  04/10/2023          INTERPRETATION:  CLINICAL INFORMATION: Status post umbilical hernia   repair/clinic colectomy postop day 5 with abdominal pain and vomiting    COMPARISON: 2022.    CONTRAST/COMPLICATIONS:  IV Contrast: NONE  0 cc administered   0 cc discarded  Oral Contrast: Omnipaque 300  Complications: None reported at time of study completion    PROCEDURE:  CT of the Abdomen and Pelvis was performed.  Sagittal and coronal reformats were performed.    FINDINGS:  LOWER CHEST: Within normal limits.    LIVER: Steatosis.  BILE DUCTS: Normal caliber.  GALLBLADDER: Within normal limits.  SPLEEN: Within normal limits.  PANCREAS: Within normal limits.  ADRENALS: Within normal limits.  KIDNEYS/URETERS: Left renal cyst with small peripheral.    BLADDER: Within normal limits.  REPRODUCTIVE ORGANS: Uterus and adnexa within normal limits.    BOWEL: Moderate to large amount of stool throughout the colon No bowel   obstruction. Appendix is normal.  PERITONEUM: No ascites.  VESSELS: Within normal limits.  RETROPERITONEUM/LYMPH NODES: Few subcentimeter retroperitoneal nodes.  ABDOMINAL WALL: Partially visualized lipoma in the right anteriorthigh   musculature. Postsurgical changes in the anterior abdominal wall with   stranding and several small foci of subcutaneous air. 2 surgical drains   are noted in the anterior abdominal wall.  BONES: Degenerative changes.    IMPRESSION:  Moderate to large amount of stool throughout the colon. No bowel   obstruction.    Postsurgical changes in the anterior abdominal wall with stranding and   several small foci of subcutaneous air.    Additional findings as above.    --- End of Report ---            VALENCIA GILL MD; Attending Radiologist  This document has been electronically signed. Apr 10 2023  9:07AM    < end of copied text >      Imaging Personally Reviewed:  [ x ] YES  [ ] NO

## 2023-04-10 NOTE — H&P ADULT - NSHPPHYSICALEXAM_GEN_ALL_CORE
T(C): 37.2 (04-10-23 @ 03:57), Max: 37.2 (04-10-23 @ 03:57)  HR: 120 (04-10-23 @ 03:57) (120 - 120)  BP: 125/88 (04-10-23 @ 03:57) (125/88 - 125/88)  RR: 22 (04-10-23 @ 03:57) (22 - 22)  SpO2: 99% (04-10-23 @ 03:57) (99% - 99%)    CONSTITUTIONAL: no apparent distress, lying in bed, appears uncomfortably  EYES: PERRLA and symmetric, EOMI, No conjunctival or scleral injection, non-icteric  ENMT: Oral mucosa with moist membranes.   NECK: Supple, symmetric and without tracheal deviation   RESP: No respiratory distress, no use of accessory muscles  CV: RRR, no JVD; no peripheral edema  GI: abdominal binder in place, steristrips at the surgical incision lower abdomen with sanguinous staining, no active bleeding. HILARIO drains x 2, with serosanguinous drainage. Drains milked at bedside.  SKIN: No rashes or ulcers noted  NEURO: CN II-XII intact  PSYCH: Appropriate insight/judgment; A+O x 3, mood and affect appropriate, recent/remote memory intact

## 2023-04-10 NOTE — ED PROVIDER NOTE - ABDOMINAL EXAM
Surgical scars noted.  Patient has 2 HILARIO drains 1 on the right and 1 on the left from the panniculectomy scar draining serosanguineous liquid.  Abdomen is tender mostly on the left.  Hypoactive bowel sounds./DISTENDED

## 2023-04-10 NOTE — CONSULT NOTE ADULT - PROBLEM SELECTOR RECOMMENDATION 2
Chronic   - LDSS   - Hypoglycemia protocol   - Accuchecks Chronic   - on metformin and steglatro at home, holding while in hospital  - goal glucose in hospital setting is between 100-180, pt is currently at goal  - add LDSS   - Hypoglycemia protocol   - Demetrio

## 2023-04-10 NOTE — ED ADULT TRIAGE NOTE - CHIEF COMPLAINT QUOTE
pt reports she had abdominal surgery on wednesday at [place]. pt states she has been having cramping and vomiting since last night, no BM since tuesday. pt called surgeon on call and was told to come to [place]

## 2023-04-10 NOTE — H&P ADULT - NSICDXPASTSURGICALHX_GEN_ALL_CORE_FT
PAST SURGICAL HISTORY:  H/O laparoscopic adjustable gastric banding 2012    History of removal of laparoscopic gastric banding device     History of wisdom tooth extraction 2008    S/P hernia repair     Status post panniculectomy

## 2023-04-11 ENCOUNTER — TRANSCRIPTION ENCOUNTER (OUTPATIENT)
Age: 38
End: 2023-04-11

## 2023-04-11 LAB
ANION GAP SERPL CALC-SCNC: 4 MMOL/L — LOW (ref 5–17)
BUN SERPL-MCNC: 15 MG/DL — SIGNIFICANT CHANGE UP (ref 7–23)
CALCIUM SERPL-MCNC: 8.5 MG/DL — SIGNIFICANT CHANGE UP (ref 8.5–10.1)
CHLORIDE SERPL-SCNC: 105 MMOL/L — SIGNIFICANT CHANGE UP (ref 96–108)
CO2 SERPL-SCNC: 29 MMOL/L — SIGNIFICANT CHANGE UP (ref 22–31)
CREAT SERPL-MCNC: 0.55 MG/DL — SIGNIFICANT CHANGE UP (ref 0.5–1.3)
CULTURE RESULTS: SIGNIFICANT CHANGE UP
EGFR: 120 ML/MIN/1.73M2 — SIGNIFICANT CHANGE UP
GLUCOSE SERPL-MCNC: 141 MG/DL — HIGH (ref 70–99)
HCT VFR BLD CALC: 37.2 % — SIGNIFICANT CHANGE UP (ref 34.5–45)
HGB BLD-MCNC: 11.4 G/DL — LOW (ref 11.5–15.5)
MCHC RBC-ENTMCNC: 24.8 PG — LOW (ref 27–34)
MCHC RBC-ENTMCNC: 30.6 GM/DL — LOW (ref 32–36)
MCV RBC AUTO: 81 FL — SIGNIFICANT CHANGE UP (ref 80–100)
NRBC # BLD: 0 /100 WBCS — SIGNIFICANT CHANGE UP (ref 0–0)
PLATELET # BLD AUTO: 265 K/UL — SIGNIFICANT CHANGE UP (ref 150–400)
POTASSIUM SERPL-MCNC: 3.6 MMOL/L — SIGNIFICANT CHANGE UP (ref 3.5–5.3)
POTASSIUM SERPL-SCNC: 3.6 MMOL/L — SIGNIFICANT CHANGE UP (ref 3.5–5.3)
RBC # BLD: 4.59 M/UL — SIGNIFICANT CHANGE UP (ref 3.8–5.2)
RBC # FLD: 14.1 % — SIGNIFICANT CHANGE UP (ref 10.3–14.5)
SODIUM SERPL-SCNC: 138 MMOL/L — SIGNIFICANT CHANGE UP (ref 135–145)
SPECIMEN SOURCE: SIGNIFICANT CHANGE UP
TSH SERPL-MCNC: 0.54 UIU/ML — SIGNIFICANT CHANGE UP (ref 0.36–3.74)
WBC # BLD: 5.04 K/UL — SIGNIFICANT CHANGE UP (ref 3.8–10.5)
WBC # FLD AUTO: 5.04 K/UL — SIGNIFICANT CHANGE UP (ref 3.8–10.5)

## 2023-04-11 PROCEDURE — 99232 SBSQ HOSP IP/OBS MODERATE 35: CPT

## 2023-04-11 RX ORDER — CEFAZOLIN SODIUM 1 G
1000 VIAL (EA) INJECTION EVERY 8 HOURS
Refills: 0 | Status: COMPLETED | OUTPATIENT
Start: 2023-04-11 | End: 2023-04-12

## 2023-04-11 RX ORDER — POLYETHYLENE GLYCOL 3350 17 G/17G
17 POWDER, FOR SOLUTION ORAL
Refills: 0 | Status: DISCONTINUED | OUTPATIENT
Start: 2023-04-11 | End: 2023-04-12

## 2023-04-11 RX ORDER — ENOXAPARIN SODIUM 100 MG/ML
40 INJECTION SUBCUTANEOUS EVERY 24 HOURS
Refills: 0 | Status: DISCONTINUED | OUTPATIENT
Start: 2023-04-11 | End: 2023-04-12

## 2023-04-11 RX ORDER — ACETAMINOPHEN 500 MG
650 TABLET ORAL EVERY 6 HOURS
Refills: 0 | Status: DISCONTINUED | OUTPATIENT
Start: 2023-04-11 | End: 2023-04-12

## 2023-04-11 RX ORDER — SODIUM CHLORIDE 9 MG/ML
1000 INJECTION, SOLUTION INTRAVENOUS
Refills: 0 | Status: DISCONTINUED | OUTPATIENT
Start: 2023-04-11 | End: 2023-04-12

## 2023-04-11 RX ORDER — INSULIN LISPRO 100/ML
VIAL (ML) SUBCUTANEOUS AT BEDTIME
Refills: 0 | Status: DISCONTINUED | OUTPATIENT
Start: 2023-04-11 | End: 2023-04-12

## 2023-04-11 RX ORDER — INSULIN LISPRO 100/ML
VIAL (ML) SUBCUTANEOUS
Refills: 0 | Status: DISCONTINUED | OUTPATIENT
Start: 2023-04-11 | End: 2023-04-12

## 2023-04-11 RX ADMIN — Medication 30 MILLIGRAM(S): at 05:14

## 2023-04-11 RX ADMIN — Medication 650 MILLIGRAM(S): at 15:52

## 2023-04-11 RX ADMIN — Medication 650 MILLIGRAM(S): at 16:44

## 2023-04-11 RX ADMIN — Medication 100 MICROGRAM(S): at 05:09

## 2023-04-11 RX ADMIN — Medication 100 MILLIGRAM(S): at 21:50

## 2023-04-11 RX ADMIN — MAGNESIUM HYDROXIDE 30 MILLILITER(S): 400 TABLET, CHEWABLE ORAL at 11:04

## 2023-04-11 RX ADMIN — ENOXAPARIN SODIUM 40 MILLIGRAM(S): 100 INJECTION SUBCUTANEOUS at 10:24

## 2023-04-11 RX ADMIN — Medication 1000 MILLIGRAM(S): at 00:18

## 2023-04-11 RX ADMIN — Medication 30 MILLIGRAM(S): at 05:32

## 2023-04-11 RX ADMIN — POLYETHYLENE GLYCOL 3350 17 GRAM(S): 17 POWDER, FOR SOLUTION ORAL at 10:25

## 2023-04-11 RX ADMIN — MAGNESIUM HYDROXIDE 30 MILLILITER(S): 400 TABLET, CHEWABLE ORAL at 00:03

## 2023-04-11 RX ADMIN — Medication 1000 MILLIGRAM(S): at 06:53

## 2023-04-11 RX ADMIN — Medication 400 MILLIGRAM(S): at 06:19

## 2023-04-11 RX ADMIN — PANTOPRAZOLE SODIUM 40 MILLIGRAM(S): 20 TABLET, DELAYED RELEASE ORAL at 11:04

## 2023-04-11 RX ADMIN — Medication 100 MILLIGRAM(S): at 13:08

## 2023-04-11 RX ADMIN — POLYETHYLENE GLYCOL 3350 17 GRAM(S): 17 POWDER, FOR SOLUTION ORAL at 17:00

## 2023-04-11 NOTE — DIETITIAN INITIAL EVALUATION ADULT - ORAL INTAKE PTA/DIET HISTORY
patient reports with surgery last week with no BM since that time small hard BM per patient today after enema . suppository last night  NCS style diet followed PTA . lap band many years ago then removed had lost ~ 60# gained back during pandemic. mindful eating followed now. has tried weight watchers work with nutritionist outside. does admit to doing exercise at home.   patient on metformin and steglatro. allergies to peaches peanuts frozen green beans .   HgbA1c 6.7% patient states blood sugars improved with change in home medications  N/V and constipation PTA

## 2023-04-11 NOTE — DISCHARGE NOTE PROVIDER - DETAILS OF MALNUTRITION DIAGNOSIS/DIAGNOSES
This patient has been assessed with a concern for Malnutrition and was treated during this hospitalization for the following Nutrition diagnosis/diagnoses:     -  04/11/2023: Morbid obesity (BMI > 40)

## 2023-04-11 NOTE — SBIRT NOTE ADULT - NSSBIRTDRGCOMPLAIN_GEN_A_CORE
parents feel prescribed medication not necessary - but [place] [facility] [place]Yes

## 2023-04-11 NOTE — PROGRESS NOTE ADULT - ASSESSMENT
1 wk s/p panniculectomy, hernia repair, and umbilicoplasty. Admitted with N/V, constipation.       -continue to empty and record drain outputs  -continue abdominal binder  -pain control  -OOB and ambulate, DVT ppx  -no heavy lifting  -management per medical team appreciated  -please call with any additional questions or concerns [phone]

## 2023-04-11 NOTE — DISCHARGE NOTE PROVIDER - HOSPITAL COURSE
HPI:  38yoF with PMHx of obesity, type 2 diabetes, hypothyroidism, ADHD, now POD#5 umbilical/ventral hernia repair, umbilectomy, abdominal panniculectomy, and umbilicoplasty, a/w nausea/vomiting and constipation. Patient states abdominal pain since 7:30-8pm after eating lobster/steak/fries for dinner with multiple episodes of vomiting and nausea. Pt states no vomiting since arrival to the ED. As well, pt states no BM since tuesday, prior to surgery. Pt now in the ED s/p enema and PO contrast for CT scan, had small bowel movement now in the ED, still feeling nauseous, Denies fever, chills, sob, chest pain, diarrhea, hematuria, dysuria.      Hospital course:  Patient was given aggressive bowel regimen, she had a bowel movement on hospital day #2.  Patient was monitored with serial abdominal exams and daily labs.  Patient is surgically stable and stable for discharge on hospital day #___.      Patient to follow up with Dr. Winter in 1 week.    HPI:  38yoF with PMHx of obesity, type 2 diabetes, hypothyroidism, ADHD, now POD#5 umbilical/ventral hernia repair, umbilectomy, abdominal panniculectomy, and umbilicoplasty, a/w nausea/vomiting and constipation. Patient states abdominal pain since 7:30-8pm after eating lobster/steak/fries for dinner with multiple episodes of vomiting and nausea. Pt states no vomiting since arrival to the ED. As well, pt states no BM since tuesday, prior to surgery. Pt now in the ED s/p enema and PO contrast for CT scan, had small bowel movement now in the ED, still feeling nauseous, Denies fever, chills, sob, chest pain, diarrhea, hematuria, dysuria.      Hospital course:  Patient was given aggressive bowel regimen, she had a bowel movement on hospital day #2.  Patient was monitored with serial abdominal exams and daily labs.  Patient is surgically stable and stable for discharge following slow advancement of diet, abstinence from narcotic medication and ongoing aggressive bowel regimen on hospital day #3.      To continue with VNS at home with outpatient follow-up with Plastic Surgeon for removal of drains and follow-up with Dr. Winter once discharged from the care of Dr. Dawson.        Loli

## 2023-04-11 NOTE — PROGRESS NOTE ADULT - ASSESSMENT
37 yo female with PMHx of obesity, type 2 diabetes, hypothyroidism, ADHD, now POD#5 umbilical/ventral hernia repair, umbilectomy, abdominal panniculectomy, and umbilicoplasty presents to the ED for nausea and vomiting since 8pm yesterday. Admitted for abdominal pain and constipation.      Problem/Recommendation - 1:  ·  Problem: Abdominal pain.   ·  Recommendation: Pt presents to ED for abdominal pain with associated nausea and vomiting since last night   - POD#5 umbilical/ventral hernia repair, umbilectomy, abdominal panniculectomy, and umbilicoplasty   - Elicits no BM since prior to surgery last week   - s/p mineral oil enema x1, NS bolus x1, Benadryl 50mg x1, zofran x1 in the ED   - CT abdomen and Pelvis showed Moderate to large amount of stool throughout the colon. No bowel   obstruction. Postsurgical changes in the anterior abdominal wall with stranding and several small foci of subcutaneous air.  - Care per primary surgical team.     Problem/Recommendation - 2:  ·  Problem: Type 2 diabetes mellitus.   ·  Recommendation: Chronic   - on metformin and steglatro at home, holding while in hospital  - goal glucose in hospital setting is between 100-180, pt is currently at goal  - add LDSS   - Hypoglycemia protocol   - Accuchecks.     Problem/Recommendation - 3:  ·  Problem: ADHD.   ·  Recommendation: Chronic   - continue home concerta and ritalin.     Problem/Recommendation - 4:  ·  Problem: Hypothyroidism.   ·  Recommendation: Chronic   - continue home synthroid  - check TSH.     Problem/Recommendation - 5:  ·  Problem: Need for prophylactic measure.   ·  Recommendation: As per primary surgical team.

## 2023-04-11 NOTE — DIETITIAN INITIAL EVALUATION ADULT - CONTINUE CURRENT NUTRITION CARE PLAN
continue NPO advance to clear liquids per MD discretion. may benefit from high fiber diet consistent cho restrictions/yes

## 2023-04-11 NOTE — DISCHARGE NOTE PROVIDER - PROVIDER TOKENS
PROVIDER:[TOKEN:[[id number]]],PROVIDER:[TOKEN:[[id number]]] PROVIDER:[TOKEN:[[id number]],FOLLOWUP:[Routine]],PROVIDER:[TOKEN:[[id number]],FOLLOWUP:[1-3 days]]

## 2023-04-11 NOTE — PROGRESS NOTE ADULT - SUBJECTIVE AND OBJECTIVE BOX
S: Patient seen and examined at bedside, POD#6 umbilical/ventral hernia repair with panniculectomy, umbiliectomy.  Patient received mineral oil enema, senna, doculax and senna yesterday with 2 small/hard BMs which patient describes as "david" (1 yesterday during the day and 1 overnight).  Patient reports improvement of nausea, however with abdominal cramping associated with straining while using the bathroom.  Voiding, ambulating and currently NPO, however with appetite. Patient denies any fever, chills, chest pain, shortness of breath, nausea, vomiting, or urinary complaints.    MEDICATIONS:  dextrose 5%. 1000 milliLiter(s) IV Continuous <Continuous>  dextrose 5%. 1000 milliLiter(s) IV Continuous <Continuous>  dextrose 50% Injectable 25 Gram(s) IV Push once  dextrose 50% Injectable 12.5 Gram(s) IV Push once  dextrose 50% Injectable 25 Gram(s) IV Push once  dextrose Oral Gel 15 Gram(s) Oral once PRN  glucagon  Injectable 1 milliGRAM(s) IntraMuscular once  insulin lispro (ADMELOG) corrective regimen sliding scale   SubCutaneous every 6 hours  ketorolac   Injectable 30 milliGRAM(s) IV Push every 6 hours PRN  lactated ringers. 1000 milliLiter(s) IV Continuous <Continuous>  levothyroxine 100 MICROGram(s) Oral daily  magnesium hydroxide Suspension 30 milliLiter(s) Oral daily  ondansetron Injectable 4 milliGRAM(s) IV Push every 4 hours PRN  pantoprazole  Injectable 40 milliGRAM(s) IV Push daily  senna 2 Tablet(s) Oral at bedtime      O:  Vital Signs Last 24 Hrs  T(C): 36.5 (11 Apr 2023 04:20), Max: 36.7 (10 Apr 2023 13:52)  T(F): 97.7 (11 Apr 2023 04:20), Max: 98.1 (10 Apr 2023 13:52)  HR: 101 (11 Apr 2023 04:20) (93 - 101)  BP: 117/86 (11 Apr 2023 04:20) (112/68 - 117/86)  RR: 18 (10 Apr 2023 20:40) (18 - 18)  SpO2: 98% (11 Apr 2023 04:20) (96% - 99%)    Parameters below as of 11 Apr 2023 04:20  Patient On (Oxygen Delivery Method): room air    I&O SUMMARY:    04-10-23 @ 07:01  -  04-11-23 @ 07:00  --------------------------------------------------------  IN:    Lactated Ringers: 160 mL  Total IN: 160 mL    OUT:    Drain (mL): 50 mL    Drain (mL): 80 mL  Total OUT: 130 mL    Total NET: 30 mL    PHYSICAL EXAM:  GENERAL: No acute distress, lying comfortably in bed  HEAD:  Atraumatic, Normocephalic  CHEST/LUNG: Non-labored respirations, no accessory muscle use  HEART: Regular rhythm tachycardia  ABDOMEN: abdominal binder in place, with 2 JPs in place with serosanguinous drainage, lower abdomen incisions with steristrips in place with staining otherwise intact.  EXT: calves non-tender b/l  NEUROLOGY: A&O x 3, no focal deficits    LABS:                        11.4   5.04  )-----------( 265      ( 11 Apr 2023 06:20 )             37.2     04-11    138  |  105  |  15  ----------------------------<  141<H>  3.6   |  29  |  0.55    Ca    8.5      11 Apr 2023 06:20    TPro  7.3  /  Alb  3.0<L>  /  TBili  0.5  /  DBili  x   /  AST  29  /  ALT  42  /  AlkPhos  85  04-10

## 2023-04-11 NOTE — DIETITIAN INITIAL EVALUATION ADULT - OTHER INFO
Reason for Admission: nausea vomiting, constipation  History of Present Illness:   38yoF with PMHx of obesity, type 2 diabetes, hypothyroidism, ADHD, now POD#5 umbilical/ventral hernia repair, umbilectomy, abdominal panniculectomy, and umbilicoplasty, a/w nausea/vomiting and constipation. Patient states abdominal pain since 7:30-8pm after eating lobster/steak/fries for dinner with multiple episodes of vomiting and nausea. Pt states no vomiting since arrival to the ED. As well, pt states no BM since tuesday, prior to surgery  weight 248# prior to abdominal panniculectomy bed scale here 251.5#   LR 80ml hr   patient states no N/V now as above small BM only since admit

## 2023-04-11 NOTE — DIETITIAN INITIAL EVALUATION ADULT - NSPROEDAREADYLEARN_GEN_A_NUR
patient refusing education at this time appears aware of all verbal weight loss tips and familiar with consistent cho diet

## 2023-04-11 NOTE — DISCHARGE NOTE PROVIDER - NSDCCPCAREPLAN_GEN_ALL_CORE_FT
PRINCIPAL DISCHARGE DIAGNOSIS  Diagnosis: Abdominal pain  Assessment and Plan of Treatment:       SECONDARY DISCHARGE DIAGNOSES  Diagnosis: Nausea & vomiting  Assessment and Plan of Treatment:     Diagnosis: Constipation  Assessment and Plan of Treatment:

## 2023-04-11 NOTE — DISCHARGE NOTE PROVIDER - NSDCACTIVITY_GEN_ALL_CORE
Return to Work/School allowed/Stairs allowed Do not drive or operate machinery/Do not make important decisions/Stairs allowed/Walking - Indoors allowed/No heavy lifting/straining/Walking - Outdoors allowed/Follow Instructions Provided by your Surgical Team

## 2023-04-11 NOTE — PROGRESS NOTE ADULT - SUBJECTIVE AND OBJECTIVE BOX
Patient is 1 wk s/p abdominal panniculectomy, umbilectomy, hernia repair, and umbilical reconstruction.  She had been at home recovering well until Sunday evening when she experienced nausea and vomiting. Now admitted with constipation. She reports that her surgical pain has been improving over the last few days, and that her drain outputs appear to be slowing down, right more so than left. She continues to wear the abdominal binder. CT scan reviewed.    Vital Signs Last 24 Hrs  T(C): 36.5 (11 Apr 2023 04:20), Max: 36.7 (10 Apr 2023 13:52)  T(F): 97.7 (11 Apr 2023 04:20), Max: 98.1 (10 Apr 2023 13:52)  HR: 101 (11 Apr 2023 04:20) (93 - 101)  BP: 117/86 (11 Apr 2023 04:20) (112/68 - 117/86)  BP(mean): --  RR: 18 (10 Apr 2023 20:40) (18 - 18)  SpO2: 98% (11 Apr 2023 04:20) (96% - 99%)    Parameters below as of 11 Apr 2023 04:20  Patient On (Oxygen Delivery Method): room air    I&O's Detail    10 Apr 2023 07:01  -  11 Apr 2023 07:00  --------------------------------------------------------  IN:    Lactated Ringers: 160 mL  Total IN: 160 mL    OUT:    Drain (mL): 50 mL    Drain (mL): 80 mL  Total OUT: 130 mL    Total NET: 30 mL      Exam:  NAD, AxOx3  Abdomen is softly distended  Lower incision is clean, dry, and intact  Umbilical incision is clean, dry, and intact  No signs of bleeding or infection  Drains serosanguinous

## 2023-04-11 NOTE — DISCHARGE NOTE PROVIDER - NSDCMRMEDTOKEN_GEN_ALL_CORE_FT
Concerta 54 mg/24 hr oral tablet, extended release: 1 tab(s) orally once a day (in the morning)  levothyroxine 100 mcg (0.1 mg) oral tablet: 1 tab(s) orally once a day  metFORMIN 500 mg oral tablet: 500 milligram(s) orally 2 times a day  Ritalin 5 mg oral tablet: 5 milligram(s) orally once a day, As Needed  Steglatro 15 mg oral tablet: 1 tab(s) orally once a day (in the morning)   acetaminophen 325 mg oral tablet: 2 tab(s) orally every 6 hours As needed Mild Pain (1 - 3)  Concerta 54 mg/24 hr oral tablet, extended release: 1 tab(s) orally once a day (in the morning)  levothyroxine 100 mcg (0.1 mg) oral tablet: 1 tab(s) orally once a day  metFORMIN 500 mg oral tablet: 500 milligram(s) orally 2 times a day  polyethylene glycol 3350 oral powder for reconstitution: 17 gram(s) orally 2 times a day  Ritalin 5 mg oral tablet: 5 milligram(s) orally once a day, As Needed  senna leaf extract oral tablet: 2 tab(s) orally once a day (at bedtime)  Steglatro 15 mg oral tablet: 1 tab(s) orally once a day (in the morning)

## 2023-04-11 NOTE — SBIRT NOTE ADULT - NSSBIRTDRGPOSREINDET_GEN_A_CORE
Pt reports eating THC gummies before bed to assist with sleep because she feels that she "can't turn off her brain at night". Pt has therapist/ psychiatrist to manage MH meds. No further resources requested at this time. SW to remain available for any needs.

## 2023-04-11 NOTE — CARE COORDINATION ASSESSMENT. - OTHER PERTINENT REFERRAL INFORMATION
Met with the patient at the bedside. Explained the role of case management/discharge planning. Patient verbalized understanding. Provided contact information and discharge planning packet. Patient resides alone and was independent PTA. Patient admitted with C/O nausea, vomiting and abd pain S/P hernia repair 5 days ago. No anticipated skilled discharge needs noted at the present time. Will remain available to patient throughout hospital stay

## 2023-04-11 NOTE — DISCHARGE NOTE PROVIDER - NSDCFUADDINST_GEN_ALL_CORE_FT
REST and relax!    Apply ice packs for 30 minutes on/ 30 minutes off every hour while awake for next 48 hours.  May take Tylenol for minor pain.  May take Advil or Motrin for moderate pain.  Please avoid all narcotic medications.  Continue to take Miralax twice daily with evening Senna or Senna tea or Colace as part of ongoing post operative bowel regimen.  Call for any questions or concerns!

## 2023-04-11 NOTE — CARE COORDINATION ASSESSMENT. - NSCAREPROVIDERS_GEN_ALL_CORE_FT
CARE PROVIDERS:  Accepting Physician: Noé Winter  Administration: Quinn Benedict  Administration: Henrry Tipton  Admitting: Noé Winter  Attending: Noé Winter  Case Management: Katie Zapata  Consultant: Matt Presley  Consultant: Frances Price  Consultant: Noé Winter  ED Attending: Kaye Quiros  ED Attending2: Nirav Dejesus  ED Nurse: Norma Kumari  Nurse: Pina Soto  Nurse: Solis Norris  Nurse: Barb Pascual  Nurse: Lani Cramer  Ordered: ADM, User  Ordered: Doctor, Unknown  Override: Nenita Herdnon  PCA/Nursing Assistant: Mariusz Ramos  Primary Team: Chong Noe  Primary Team: Franny Wiley  Primary Team: James Delong  Primary Team: Pina Gramajo  Primary Team: Frances Aldridge  Registered Dietitian: Suzy Stephenson  Respiratory Therapy: Mariella Waters  : Merle Castellon// Supp. Assoc.: Mary Ann Garcia

## 2023-04-11 NOTE — DIETITIAN INITIAL EVALUATION ADULT - NS FNS DIET ORDER
Diet, NPO:   With Ice Chips/Sips of Water     Special Instructions for Nursing:  With Ice Chips/Sips of Water (04-10-23 @ 10:09)

## 2023-04-11 NOTE — DISCHARGE NOTE PROVIDER - NSDCFUSCHEDAPPT_GEN_ALL_CORE_FT
Shikowitz-Behr, Lauren B  [facility]  [facility]AST[facility]  Scheduled Appointment: 04/14/2023

## 2023-04-11 NOTE — PROGRESS NOTE ADULT - ASSESSMENT
38yoF with PMHx of obesity, type 2 diabetes, hypothyroidism, ADHD, now POD#6 umbilical/ventral hernia repair, umbilectomy, abdominal panniculectomy, and umbilicoplasty, a/w nausea/vomiting and constipation. Pt reports no BM since last tuesday prior to her surgery, now s/p mineral oil enema, senna, doculax and milk of magnesia with 2 small hard BMs. Exam unremarkable.    PLAN:  - VS notable for tachycardia to 101  - Labs unremarkable  - CT scan in the ED with large stool burden and post surgical changes. Now s/p mineral oil enema, senna, doculax and milk of magnesia with 2 small hard BMs  - Pain regimen, supportive care  - HILARIO drains in place with serosanguinous drainage (50cc R, 80cc L)  - Encouraged ambulation/OOB  - Continue NPO    To be discussed with Dr. Winter. 38yoF with PMHx of obesity, type 2 diabetes, hypothyroidism, ADHD, now POD#6 umbilical/ventral hernia repair, umbilectomy, abdominal panniculectomy, and umbilicoplasty, a/w nausea/vomiting and constipation. Pt reports no BM since last tuesday prior to her surgery, now s/p mineral oil enema, senna, doculax and milk of magnesia with 2 small hard BMs. Exam unremarkable.    PLAN:  - VS notable for tachycardia to 101  - Labs unremarkable  - CT scan in the ED with large stool burden and post surgical changes. Now s/p mineral oil enema, senna, doculax and milk of magnesia with 2 small hard BMs  - Start clears, IVF LR 50cc/hr  - Start Miralax BID. SMOG enema and GI consult tomorrow if no BM today.  - Pain regimen, supportive care  - HILARIO drains in place with serosanguinous drainage (50cc R, 80cc L)  - Encouraged ambulation/OOB    Discussed with Dr. Winter. 38yoF with PMHx of obesity, type 2 diabetes, hypothyroidism, ADHD, now POD#6 umbilical/ventral hernia repair, umbilectomy, abdominal panniculectomy, and umbilicoplasty, a/w nausea/vomiting and constipation. Pt reports no BM since last tuesday prior to her surgery, now s/p mineral oil enema, senna, doculax and milk of magnesia with 2 small hard BMs. Exam unremarkable.  PLAN:  - VS notable for tachycardia to 101  - Labs unremarkable  - CT scan in the ED with large stool burden and post surgical changes. Now s/p mineral oil enema, senna, doculax and milk of magnesia with 2 small hard BMs  - Start clears, IVF LR 50cc/hr  - Start Miralax BID. SMOG enema and GI consult tomorrow if no BM today.  - Pain regimen, supportive care  - HILARIO drains in place with serosanguinous drainage (50cc R, 80cc L)  - Encouraged ambulation/OOB     Discussed with Dr. Winter.    As in above full PA notation.  Ms. Diane personally seen and examined during p.m. rounds.  Now starting to move bowels and thereby given clear liquids, with plan to advance.  Vitals non-suggestive overall with minimal tachcardia noted.  Wounds clean, drains secure, abdomen soft with appropriate incisional tenderness.  Labs reassuring from today with normalization of WBCs.  Clinically, improving overall.  Surgically, stable for present.  Continue aggressive bowel regimen and supportive care.  Appreciate Hospitalist assistance and Plastic Surgery follow-up.  Reviewed with patient in detail, Surgical Residents and PA's as well as today's RN team.

## 2023-04-11 NOTE — DISCHARGE NOTE PROVIDER - CARE PROVIDER_API CALL
Noé Winter)  Surgery  [address] [place] [id number]  Phone: [phone]  Fax: [phone]  Follow Up Time:     ShikowitzBehr, Lauren B (MD)  Plastic Surgery  [address] [place] [address]  Phone: [phone]  Fax: [phone]  Follow Up Time:    Noé Winter)  Surgery  [address] [place] [id number]  Phone: [phone]  Fax: [phone]  Follow Up Time: Routine    ShikowitzBehr, Lauren B (MD)  Plastic Surgery  [address] [place] [address]  Phone: [phone]  Fax: [phone]  Follow Up Time: 1-3 days

## 2023-04-11 NOTE — SOCIAL WORK PROGRESS NOTE - NSSWPROGRESSNOTE_GEN_ALL_CORE
SW met with pt at bedside to complete SBIRT. Pt doesn't feel that she needs community resources at this time as she has MH providers. SW to follow and remain available for any needs.

## 2023-04-11 NOTE — CARE COORDINATION ASSESSMENT. - NSPASTMEDSURGHISTORY_GEN_ALL_CORE_FT
PAST MEDICAL & SURGICAL HISTORY:  Fracture of clavicle  at age 4.      Closed fracture of wrist  left wrist at age 6      History of wisdom tooth extraction  2008      H/O laparoscopic adjustable gastric banding  2012      Hashimoto's disease      Umbilical hernia      DM (diabetes mellitus)      ADHD      History of ventral hernia      Hypothyroidism      History of removal of laparoscopic gastric banding device      Status post panniculectomy      S/P hernia repair

## 2023-04-11 NOTE — DIETITIAN INITIAL EVALUATION ADULT - PERTINENT LABORATORY DATA
04-11    138  |  105  |  15  ----------------------------<  141<H>  3.6   |  29  |  0.55    Ca    8.5      11 Apr 2023 06:20    TPro  7.3  /  Alb  3.0<L>  /  TBili  0.5  /  DBili  x   /  AST  29  /  ALT  42  /  AlkPhos  85  04-10  POCT Blood Glucose.: 120 mg/dL (04-11-23 @ 07:59)  A1C with Estimated Average Glucose Result: 6.7 % (03-24-23 @ 14:09)

## 2023-04-11 NOTE — DIETITIAN INITIAL EVALUATION ADULT - PERTINENT MEDS FT
MEDICATIONS  (STANDING):  dextrose 5%. 1000 milliLiter(s) (100 mL/Hr) IV Continuous <Continuous>  dextrose 5%. 1000 milliLiter(s) (50 mL/Hr) IV Continuous <Continuous>  dextrose 50% Injectable 25 Gram(s) IV Push once  dextrose 50% Injectable 12.5 Gram(s) IV Push once  dextrose 50% Injectable 25 Gram(s) IV Push once  glucagon  Injectable 1 milliGRAM(s) IntraMuscular once  insulin lispro (ADMELOG) corrective regimen sliding scale   SubCutaneous every 6 hours  lactated ringers. 1000 milliLiter(s) (80 mL/Hr) IV Continuous <Continuous>  levothyroxine 100 MICROGram(s) Oral daily  magnesium hydroxide Suspension 30 milliLiter(s) Oral daily  pantoprazole  Injectable 40 milliGRAM(s) IV Push daily  senna 2 Tablet(s) Oral at bedtime    MEDICATIONS  (PRN):  dextrose Oral Gel 15 Gram(s) Oral once PRN Blood Glucose LESS THAN 70 milliGRAM(s)/deciliter  ketorolac   Injectable 30 milliGRAM(s) IV Push every 6 hours PRN Moderate Pain (4 - 6)  ondansetron Injectable 4 milliGRAM(s) IV Push every 4 hours PRN Nausea and/or Vomiting

## 2023-04-12 ENCOUNTER — TRANSCRIPTION ENCOUNTER (OUTPATIENT)
Age: 38
End: 2023-04-12

## 2023-04-12 LAB
ANION GAP SERPL CALC-SCNC: 3 MMOL/L — LOW (ref 5–17)
BUN SERPL-MCNC: 7 MG/DL — SIGNIFICANT CHANGE UP (ref 7–23)
CALCIUM SERPL-MCNC: 8.6 MG/DL — SIGNIFICANT CHANGE UP (ref 8.5–10.1)
CHLORIDE SERPL-SCNC: 108 MMOL/L — SIGNIFICANT CHANGE UP (ref 96–108)
CO2 SERPL-SCNC: 30 MMOL/L — SIGNIFICANT CHANGE UP (ref 22–31)
CREAT SERPL-MCNC: 0.55 MG/DL — SIGNIFICANT CHANGE UP (ref 0.5–1.3)
EGFR: 120 ML/MIN/1.73M2 — SIGNIFICANT CHANGE UP
GLUCOSE SERPL-MCNC: 118 MG/DL — HIGH (ref 70–99)
HCT VFR BLD CALC: 35.4 % — SIGNIFICANT CHANGE UP (ref 34.5–45)
HGB BLD-MCNC: 10.8 G/DL — LOW (ref 11.5–15.5)
MCHC RBC-ENTMCNC: 24.7 PG — LOW (ref 27–34)
MCHC RBC-ENTMCNC: 30.5 GM/DL — LOW (ref 32–36)
MCV RBC AUTO: 81 FL — SIGNIFICANT CHANGE UP (ref 80–100)
NRBC # BLD: 0 /100 WBCS — SIGNIFICANT CHANGE UP (ref 0–0)
PLATELET # BLD AUTO: 270 K/UL — SIGNIFICANT CHANGE UP (ref 150–400)
POTASSIUM SERPL-MCNC: 4 MMOL/L — SIGNIFICANT CHANGE UP (ref 3.5–5.3)
POTASSIUM SERPL-SCNC: 4 MMOL/L — SIGNIFICANT CHANGE UP (ref 3.5–5.3)
RBC # BLD: 4.37 M/UL — SIGNIFICANT CHANGE UP (ref 3.8–5.2)
RBC # FLD: 14 % — SIGNIFICANT CHANGE UP (ref 10.3–14.5)
SODIUM SERPL-SCNC: 141 MMOL/L — SIGNIFICANT CHANGE UP (ref 135–145)
WBC # BLD: 4.19 K/UL — SIGNIFICANT CHANGE UP (ref 3.8–10.5)
WBC # FLD AUTO: 4.19 K/UL — SIGNIFICANT CHANGE UP (ref 3.8–10.5)

## 2023-04-12 PROCEDURE — 99285 EMERGENCY DEPT VISIT HI MDM: CPT

## 2023-04-12 PROCEDURE — 81001 URINALYSIS AUTO W/SCOPE: CPT

## 2023-04-12 PROCEDURE — 84443 ASSAY THYROID STIM HORMONE: CPT

## 2023-04-12 PROCEDURE — 80053 COMPREHEN METABOLIC PANEL: CPT

## 2023-04-12 PROCEDURE — 74176 CT ABD & PELVIS W/O CONTRAST: CPT | Mod: MA

## 2023-04-12 PROCEDURE — 36415 COLL VENOUS BLD VENIPUNCTURE: CPT

## 2023-04-12 PROCEDURE — 96375 TX/PRO/DX INJ NEW DRUG ADDON: CPT

## 2023-04-12 PROCEDURE — 83690 ASSAY OF LIPASE: CPT

## 2023-04-12 PROCEDURE — 82962 GLUCOSE BLOOD TEST: CPT

## 2023-04-12 PROCEDURE — 82947 ASSAY GLUCOSE BLOOD QUANT: CPT

## 2023-04-12 PROCEDURE — 96374 THER/PROPH/DIAG INJ IV PUSH: CPT

## 2023-04-12 PROCEDURE — 85025 COMPLETE CBC W/AUTO DIFF WBC: CPT

## 2023-04-12 PROCEDURE — 87086 URINE CULTURE/COLONY COUNT: CPT

## 2023-04-12 PROCEDURE — 87635 SARS-COV-2 COVID-19 AMP PRB: CPT

## 2023-04-12 PROCEDURE — 85027 COMPLETE CBC AUTOMATED: CPT

## 2023-04-12 PROCEDURE — 99233 SBSQ HOSP IP/OBS HIGH 50: CPT

## 2023-04-12 PROCEDURE — 80048 BASIC METABOLIC PNL TOTAL CA: CPT

## 2023-04-12 PROCEDURE — 84702 CHORIONIC GONADOTROPIN TEST: CPT

## 2023-04-12 RX ORDER — ACETAMINOPHEN 500 MG
2 TABLET ORAL
Qty: 0 | Refills: 0 | DISCHARGE
Start: 2023-04-12

## 2023-04-12 RX ORDER — POLYETHYLENE GLYCOL 3350 17 G/17G
17 POWDER, FOR SOLUTION ORAL
Qty: 0 | Refills: 0 | DISCHARGE
Start: 2023-04-12

## 2023-04-12 RX ORDER — SENNA PLUS 8.6 MG/1
2 TABLET ORAL
Qty: 0 | Refills: 0 | DISCHARGE
Start: 2023-04-12

## 2023-04-12 RX ADMIN — MAGNESIUM HYDROXIDE 30 MILLILITER(S): 400 TABLET, CHEWABLE ORAL at 11:51

## 2023-04-12 RX ADMIN — Medication 100 MILLIGRAM(S): at 05:17

## 2023-04-12 RX ADMIN — PANTOPRAZOLE SODIUM 40 MILLIGRAM(S): 20 TABLET, DELAYED RELEASE ORAL at 11:51

## 2023-04-12 RX ADMIN — Medication 100 MICROGRAM(S): at 05:17

## 2023-04-12 RX ADMIN — POLYETHYLENE GLYCOL 3350 17 GRAM(S): 17 POWDER, FOR SOLUTION ORAL at 05:17

## 2023-04-12 RX ADMIN — ENOXAPARIN SODIUM 40 MILLIGRAM(S): 100 INJECTION SUBCUTANEOUS at 11:51

## 2023-04-12 NOTE — PROGRESS NOTE ADULT - NUTRITIONAL ASSESSMENT
This patient has been assessed with a concern for Malnutrition and has been determined to have a diagnosis/diagnoses of Morbid obesity (BMI > 40).    This patient is being managed with:   Diet Regular-  Entered: Apr 12 2023  5:22AM  
This patient has been assessed with a concern for Malnutrition and has been determined to have a diagnosis/diagnoses of Morbid obesity (BMI > 40).    This patient is being managed with:   Diet Regular-  Entered: Apr 12 2023  5:22AM

## 2023-04-12 NOTE — PROGRESS NOTE ADULT - SUBJECTIVE AND OBJECTIVE BOX
Patient is 1 wk s/p abdominal panniculectomy, umbilectomy, hernia repair, and umbilical reconstruction.  She had been at home recovering well until Sunday evening when she experienced nausea and vomiting. Now admitted with constipation. She reports multiple bowel movement yesterday with significant relief. Pain is controlled. NO N/V.     Vital Signs Last 24 Hrs  T(C): 36.9 (12 Apr 2023 05:04), Max: 36.9 (12 Apr 2023 05:04)  T(F): 98.4 (12 Apr 2023 05:04), Max: 98.4 (12 Apr 2023 05:04)  HR: 79 (12 Apr 2023 05:04) (74 - 90)  BP: 123/76 (12 Apr 2023 05:04) (102/69 - 123/76)  BP(mean): --  RR: 18 (12 Apr 2023 05:04) (17 - 18)  SpO2: 96% (12 Apr 2023 05:04) (96% - 98%)    Parameters below as of 12 Apr 2023 05:04  Patient On (Oxygen Delivery Method): room air    I&O's Detail    10 Apr 2023 07:01  -  11 Apr 2023 07:00  --------------------------------------------------------  IN:    Lactated Ringers: 160 mL  Total IN: 160 mL    OUT:    Drain (mL): 50 mL    Drain (mL): 80 mL  Total OUT: 130 mL    Total NET: 30 mL      11 Apr 2023 07:01  -  12 Apr 2023 06:21  --------------------------------------------------------  IN:  Total IN: 0 mL    OUT:    Drain (mL): 30 mL    Drain (mL): 20 mL  Total OUT: 50 mL    Total NET: -50 mL      She is awake and alert, resting comfortably in bed  Binder in place  Incisions clean and dry  drains serosanguinous

## 2023-04-12 NOTE — DISCHARGE NOTE NURSING/CASE MANAGEMENT/SOCIAL WORK - NSDCPEFALRISK_GEN_ALL_CORE
For information on Fall & Injury Prevention, visit: [email or web address] OR  [email or web address] OR  https://www.cdc.gov/steadi/patient.html

## 2023-04-12 NOTE — PROGRESS NOTE ADULT - ASSESSMENT
As in above full PA notation.  Ms. Diane personally seen and examined during a.m. rounds.  Reports no nausea or vomiting.  Taking reasonable regular diet.  Multiple BM's last 24 hours.  Vitals non-suggestive with reassuring drain output by character and volume.  Wounds clean and abdomen soft with appropriate incisional tenderness.  Labs reassuring from today.  Clinically, improving overall with resolved post operative nausea and constipation.  Surgically, stable for discharge.  All discharge instructions reviewed in detail:  -Tylenol and Advil/ Motrin for pain  -Avoidance of narcotics  -BID Miralax for now  -Continue daily stool softener  -Follow-up with Plastic Surgery as appropriate for removal of drains  -Encouraged to call with questions/ concerns  Reviewed with patient in detail and Surgical PA's.

## 2023-04-12 NOTE — PROGRESS NOTE ADULT - ASSESSMENT
1 wk s/p panniculectomy, hernia repair, and umbilicoplasty. Admitted with N/V, constipation. Now improved.       -continue to empty and record drain outputs  -continue abdominal binder  -pain control  -OOB and ambulate, DVT ppx  -no heavy lifting  -patient to follow up in the office for drain removal  -please call with any additional questions or concerns [phone]

## 2023-04-12 NOTE — PROGRESS NOTE ADULT - SUBJECTIVE AND OBJECTIVE BOX
SURGERY PA NOTE ON BEHALF OF DR. PINO:    S: Patient seen and examined at bedside.   No acute events overnight.  Patient reports multiple BMs yesterday.  Reports no new abdominal pain this am.  Denies fevers, chills, chest pain, SOB, palpitations, calf pain.      MEDICATIONS:  acetaminophen     Tablet .. 650 milliGRAM(s) Oral every 6 hours PRN  dextrose 5%. 1000 milliLiter(s) IV Continuous <Continuous>  dextrose 5%. 1000 milliLiter(s) IV Continuous <Continuous>  dextrose 50% Injectable 25 Gram(s) IV Push once  dextrose 50% Injectable 12.5 Gram(s) IV Push once  dextrose 50% Injectable 25 Gram(s) IV Push once  dextrose Oral Gel 15 Gram(s) Oral once PRN  enoxaparin Injectable 40 milliGRAM(s) SubCutaneous every 24 hours  glucagon  Injectable 1 milliGRAM(s) IntraMuscular once  insulin lispro (ADMELOG) corrective regimen sliding scale   SubCutaneous at bedtime  insulin lispro (ADMELOG) corrective regimen sliding scale   SubCutaneous three times a day before meals  ketorolac   Injectable 30 milliGRAM(s) IV Push every 6 hours PRN  lactated ringers. 1000 milliLiter(s) IV Continuous <Continuous>  levothyroxine 100 MICROGram(s) Oral daily  magnesium hydroxide Suspension 30 milliLiter(s) Oral daily  ondansetron Injectable 4 milliGRAM(s) IV Push every 4 hours PRN  pantoprazole  Injectable 40 milliGRAM(s) IV Push daily  polyethylene glycol 3350 17 Gram(s) Oral two times a day  senna 2 Tablet(s) Oral at bedtime      O:  Vital Signs Last 24 Hrs  T(C): 36.9 (12 Apr 2023 05:04), Max: 36.9 (12 Apr 2023 05:04)  T(F): 98.4 (12 Apr 2023 05:04), Max: 98.4 (12 Apr 2023 05:04)  HR: 79 (12 Apr 2023 05:04) (74 - 90)  BP: 123/76 (12 Apr 2023 05:04) (102/69 - 123/76)  BP(mean): --  RR: 18 (12 Apr 2023 05:04) (17 - 18)  SpO2: 96% (12 Apr 2023 05:04) (96% - 98%)    Parameters below as of 12 Apr 2023 05:04  Patient On (Oxygen Delivery Method): room air        I&O SUMMARY:    04-10-23 @ 07:01  -  04-11-23 @ 07:00  --------------------------------------------------------  IN: 160 mL / OUT: 130 mL / NET: 30 mL    04-11-23 @ 07:01  -  04-12-23 @ 05:22  --------------------------------------------------------  IN: 0 mL / OUT: 50 mL / NET: -50 mL        PHYSICAL EXAM:  Lungs: CTA bilat without W/R/R  Card: S1S2  Abd: Soft, NT, ND.  +BS x 4.  No rebound/guarding.  Abdominal incisions c/d/I.    Ext: Calves soft, NT, without edema bilat    LABS:                        11.4   5.04  )-----------( 265      ( 11 Apr 2023 06:20 )             37.2     04-11    138  |  105  |  15  ----------------------------<  141<H>  3.6   |  29  |  0.55    Ca    8.5      11 Apr 2023 06:20            Assessment:  38yoF with PMHx of obesity, type 2 diabetes, hypothyroidism, ADHD, now POD#7 umbilical/ventral hernia repair, umbilectomy, abdominal panniculectomy, and umbilicoplasty, a/w nausea/vomiting and constipation.  Patient now having regular loose BMs following bowel regimen.  VSS.  Abdominal exam consistent for post-operative state.        Plan:  - Advance to regular diet  - F/u am labs  - Encouraged IS use/OOB/ambulation  - VTE ppx  - D/C planning for later today   - To discuss w/ Dr. Pino      SURGERY PA NOTE ON BEHALF OF DR. PINO:    S: Patient seen and examined at bedside.   No acute events overnight.  Patient reports multiple BMs yesterday.  Reports no new abdominal pain this am.  Denies fevers, chills, chest pain, SOB, palpitations, calf pain.      MEDICATIONS:  acetaminophen     Tablet .. 650 milliGRAM(s) Oral every 6 hours PRN  dextrose 5%. 1000 milliLiter(s) IV Continuous <Continuous>  dextrose 5%. 1000 milliLiter(s) IV Continuous <Continuous>  dextrose 50% Injectable 25 Gram(s) IV Push once  dextrose 50% Injectable 12.5 Gram(s) IV Push once  dextrose 50% Injectable 25 Gram(s) IV Push once  dextrose Oral Gel 15 Gram(s) Oral once PRN  enoxaparin Injectable 40 milliGRAM(s) SubCutaneous every 24 hours  glucagon  Injectable 1 milliGRAM(s) IntraMuscular once  insulin lispro (ADMELOG) corrective regimen sliding scale   SubCutaneous at bedtime  insulin lispro (ADMELOG) corrective regimen sliding scale   SubCutaneous three times a day before meals  ketorolac   Injectable 30 milliGRAM(s) IV Push every 6 hours PRN  lactated ringers. 1000 milliLiter(s) IV Continuous <Continuous>  levothyroxine 100 MICROGram(s) Oral daily  magnesium hydroxide Suspension 30 milliLiter(s) Oral daily  ondansetron Injectable 4 milliGRAM(s) IV Push every 4 hours PRN  pantoprazole  Injectable 40 milliGRAM(s) IV Push daily  polyethylene glycol 3350 17 Gram(s) Oral two times a day  senna 2 Tablet(s) Oral at bedtime      O:  Vital Signs Last 24 Hrs  T(C): 36.9 (12 Apr 2023 05:04), Max: 36.9 (12 Apr 2023 05:04)  T(F): 98.4 (12 Apr 2023 05:04), Max: 98.4 (12 Apr 2023 05:04)  HR: 79 (12 Apr 2023 05:04) (74 - 90)  BP: 123/76 (12 Apr 2023 05:04) (102/69 - 123/76)  BP(mean): --  RR: 18 (12 Apr 2023 05:04) (17 - 18)  SpO2: 96% (12 Apr 2023 05:04) (96% - 98%)    Parameters below as of 12 Apr 2023 05:04  Patient On (Oxygen Delivery Method): room air        I&O SUMMARY:    04-10-23 @ 07:01  -  04-11-23 @ 07:00  --------------------------------------------------------  IN: 160 mL / OUT: 130 mL / NET: 30 mL    04-11-23 @ 07:01  -  04-12-23 @ 05:22  --------------------------------------------------------  IN: 0 mL / OUT: 50 mL / NET: -50 mL        PHYSICAL EXAM:  Lungs: CTA bilat without W/R/R  Card: S1S2  Abd: Soft, NT, ND.  +BS x 4.  No rebound/guarding.  Abdominal incisions c/d/I.    Ext: Calves soft, NT, without edema bilat    LABS:                        11.4   5.04  )-----------( 265      ( 11 Apr 2023 06:20 )             37.2     04-11    138  |  105  |  15  ----------------------------<  141<H>  3.6   |  29  |  0.55    Ca    8.5      11 Apr 2023 06:20    Assessment:  38yoF with PMHx of obesity, type 2 diabetes, hypothyroidism, ADHD, now POD#7 umbilical/ventral hernia repair, umbilectomy, abdominal panniculectomy, and umbilicoplasty, a/w nausea/vomiting and constipation.  Patient now having regular loose BMs following bowel regimen.  VSS.  Abdominal exam consistent for post-operative state.        Plan:  - Advance to regular diet  - F/u am labs  - Encouraged IS use/OOB/ambulation  - VTE ppx  - D/C planning for later today   - To discuss w/ Dr. Pino

## 2023-04-12 NOTE — PROGRESS NOTE ADULT - REASON FOR ADMISSION
nausea vomiting, constipation

## 2023-04-12 NOTE — DISCHARGE NOTE NURSING/CASE MANAGEMENT/SOCIAL WORK - PATIENT PORTAL LINK FT
You can access the FollowMyHealth Patient Portal offered by [facility] by registering at the following website: [email or web address]. By joining "[employer]"’s FollowMyHealth portal, you will also be able to view your health information using other applications (apps) compatible with our system.

## 2023-04-12 NOTE — PROGRESS NOTE ADULT - SUBJECTIVE AND OBJECTIVE BOX
Patient is a 38y old  Female who presents with a chief complaint of nausea vomiting, constipation (2023 06:19)      Subjective:  INTERVAL HPI/OVERNIGHT EVENTS: Patient seen and examined at bedside.  Patient has no complaints at this time.   MEDICATIONS  (STANDING):  dextrose 5%. 1000 milliLiter(s) (100 mL/Hr) IV Continuous <Continuous>  dextrose 5%. 1000 milliLiter(s) (50 mL/Hr) IV Continuous <Continuous>  dextrose 50% Injectable 25 Gram(s) IV Push once  dextrose 50% Injectable 12.5 Gram(s) IV Push once  dextrose 50% Injectable 25 Gram(s) IV Push once  enoxaparin Injectable 40 milliGRAM(s) SubCutaneous every 24 hours  glucagon  Injectable 1 milliGRAM(s) IntraMuscular once  insulin lispro (ADMELOG) corrective regimen sliding scale   SubCutaneous at bedtime  insulin lispro (ADMELOG) corrective regimen sliding scale   SubCutaneous three times a day before meals  lactated ringers. 1000 milliLiter(s) (50 mL/Hr) IV Continuous <Continuous>  levothyroxine 100 MICROGram(s) Oral daily  magnesium hydroxide Suspension 30 milliLiter(s) Oral daily  pantoprazole  Injectable 40 milliGRAM(s) IV Push daily  polyethylene glycol 3350 17 Gram(s) Oral two times a day  senna 2 Tablet(s) Oral at bedtime    MEDICATIONS  (PRN):  acetaminophen     Tablet .. 650 milliGRAM(s) Oral every 6 hours PRN Mild Pain (1 - 3)  dextrose Oral Gel 15 Gram(s) Oral once PRN Blood Glucose LESS THAN 70 milliGRAM(s)/deciliter  ketorolac   Injectable 30 milliGRAM(s) IV Push every 6 hours PRN Moderate Pain (4 - 6)  ondansetron Injectable 4 milliGRAM(s) IV Push every 4 hours PRN Nausea and/or Vomiting      Allergies    adhesives (Rash)  contrast media (iodine-based) (Rash)  frozen green beans (Hives; Flushing; Angioedema)  Peaches (Urticaria; Flushing; Rhinitis)  peanuts (Hives; Angioedema)    Intolerances        REVIEW OF SYSTEMS:  CONSTITUTIONAL: No fever or chills  HEENT:  No headache, no sore throat  RESPIRATORY: No cough or shortness of breath  CARDIOVASCULAR: No chest pain or palpitations  GASTROINTESTINAL: No abd pain, nausea, vomiting, or diarrhea      Objective:  Vital Signs Last 24 Hrs  T(C): 36.9 (2023 05:04), Max: 36.9 (2023 05:04)  T(F): 98.4 (2023 05:04), Max: 98.4 (2023 05:04)  HR: 79 (2023 05:04) (74 - 90)  BP: 123/76 (2023 05:04) (102/69 - 123/76)  BP(mean): --  RR: 18 (2023 05:04) (17 - 18)  SpO2: 96% (2023 05:04) (96% - 98%)    Parameters below as of 2023 05:04  Patient On (Oxygen Delivery Method): room air        GENERAL: NAD, lying in bed comfortably  HEAD:  Normocephalic  EYES:  conjunctiva and sclera clear  ENT: Moist mucous membranes  NECK: Supple  CHEST/LUNG: Clear to auscultation bilaterally; No rales or rhonchi; no wheezing. Unlabored respirations  HEART: Regular rate and rhythm; S1S2+  ABDOMEN: Bowel sounds present; Soft, Nontender, Nondistended. , + 2 HILARIO at Sx site, left side 1-2ml pink bloody drain, no dainage fluid in HILARIO R side at present.   EXTREMITIES:  + distal Peripheral Pulses;  No cyanosis, or edema  NERVOUS SYSTEM:  Alert & Oriented X3;  No gross focal deficits   MSK: moves all extremities  SKIN: No rashes     LABS:                        10.8   4.19  )-----------( 270      ( 2023 07:00 )             35.4     2023 07:00    141    |  108    |  7      ----------------------------<  118    4.0     |  30     |  0.55     Ca    8.6        2023 07:00        Urinalysis Basic - ( 10 Apr 2023 12:00 )    Color: Yellow / Appearance: Clear / S.005 / pH: x  Gluc: x / Ketone: Negative  / Bili: Negative / Urobili: Negative   Blood: x / Protein: Negative / Nitrite: Negative   Leuk Esterase: Negative / RBC: 0-2 /HPF / WBC 0-2   Sq Epi: x / Non Sq Epi: x / Bacteria: Occasional      CAPILLARY BLOOD GLUCOSE      POCT Blood Glucose.: 115 mg/dL (2023 07:50)  POCT Blood Glucose.: 101 mg/dL (2023 21:31)  POCT Blood Glucose.: 106 mg/dL (2023 16:42)  POCT Blood Glucose.: 127 mg/dL (2023 12:38)        Culture - Urine (collected 04-10-23 @ 12:00)  Source: Clean Catch Clean Catch (Midstream)  Final Report (23 @ 13:57):    >=3 organisms. Probable collection contamination.        RADIOLOGY & ADDITIONAL TESTS:    Personally reviewed.     Consultant(s) Notes Reviewed:  [x] YES  [ ] NO    Plan of care discussed with patient /family; all questions answered

## 2023-04-12 NOTE — PROGRESS NOTE ADULT - ASSESSMENT
37 yo female with PMHx of obesity, type 2 diabetes, hypothyroidism, ADHD, now POD#5 umbilical/ventral hernia repair, umbilectomy, abdominal panniculectomy, and umbilicoplasty presents to the ED for nausea and vomiting since 8pm yesterday. Admitted for abdominal pain and constipation.      Problem/Recommendation - 1:  ·  Problem: Abdominal pain: likley due to post op status and constipation   ·  Recommendation: Pt presents to ED for abdominal pain with associated nausea and vomiting since last night   - s/p  umbilical/ventral hernia repair, umbilectomy, abdominal panniculectomy, and umbilicoplasty   - s/p mineral oil enema x1, NS bolus x1, Benadryl 50mg x1, zofran x1 in the ED   - CT abdomen and Pelvis showed Moderate to large amount of stool throughout the colon. No bowel   obstruction. Postsurgical changes in the anterior abdominal wall with stranding and several small foci of subcutaneous air.  - had BM yesterday, abd pain much improved   -medically stable for discharge and follow up with Sx as planned on Friday     Problem/Recommendation - 2:  ·  Problem: Type 2 diabetes mellitus.   ·  Recommendation: Chronic   - on metformin and steglatro at home, resume home regiment upon discharge     - 3:  ·  Problem: ADHD.   ·  Recommendation: Chronic   - continue home concerta and ritalin.     Problem/Recommendation - 4:  ·  Problem: Hypothyroidism.   ·  Recommendation: Chronic   - continue home synthroid  -follow up out patient      Problem/Recommendation - 5:  ·  Problem: Need for prophylactic measure.   ·  Recommendation: As per primary surgical team.

## 2023-04-13 VITALS
OXYGEN SATURATION: 94 % | SYSTOLIC BLOOD PRESSURE: 111 MMHG | TEMPERATURE: 98 F | HEART RATE: 53 BPM | WEIGHT: 180.12 LBS | RESPIRATION RATE: 18 BRPM | DIASTOLIC BLOOD PRESSURE: 69 MMHG

## 2023-04-14 ENCOUNTER — TRANSCRIPTION ENCOUNTER (OUTPATIENT)
Age: 38
End: 2023-04-14

## 2023-04-14 ENCOUNTER — APPOINTMENT (OUTPATIENT)
Dept: PLASTIC SURGERY | Facility: CLINIC | Age: 38
End: 2023-04-14
Payer: MEDICAID

## 2023-04-14 DIAGNOSIS — E66.01 MORBID (SEVERE) OBESITY DUE TO EXCESS CALORIES: ICD-10-CM

## 2023-04-14 PROCEDURE — 99024 POSTOP FOLLOW-UP VISIT: CPT

## 2023-04-24 ENCOUNTER — APPOINTMENT (OUTPATIENT)
Dept: PLASTIC SURGERY | Facility: CLINIC | Age: 38
End: 2023-04-24

## 2023-04-28 ENCOUNTER — APPOINTMENT (OUTPATIENT)
Dept: PLASTIC SURGERY | Facility: CLINIC | Age: 38
End: 2023-04-28
Payer: MEDICAID

## 2023-04-28 PROCEDURE — 99024 POSTOP FOLLOW-UP VISIT: CPT

## 2023-05-05 ENCOUNTER — APPOINTMENT (OUTPATIENT)
Dept: PLASTIC SURGERY | Facility: CLINIC | Age: 38
End: 2023-05-05
Payer: MEDICAID

## 2023-05-05 ENCOUNTER — APPOINTMENT (OUTPATIENT)
Dept: SURGERY | Facility: CLINIC | Age: 38
End: 2023-05-05
Payer: MEDICAID

## 2023-05-05 VITALS
TEMPERATURE: 96.8 F | WEIGHT: 243 LBS | DIASTOLIC BLOOD PRESSURE: 70 MMHG | HEIGHT: 63 IN | OXYGEN SATURATION: 98 % | BODY MASS INDEX: 43.05 KG/M2 | HEART RATE: 82 BPM | SYSTOLIC BLOOD PRESSURE: 110 MMHG

## 2023-05-05 DIAGNOSIS — Z87.19 OTHER SPECIFIED POSTPROCEDURAL STATES: ICD-10-CM

## 2023-05-05 DIAGNOSIS — L02.216 CUTANEOUS ABSCESS OF UMBILICUS: ICD-10-CM

## 2023-05-05 DIAGNOSIS — Z87.19 PERSONAL HISTORY OF OTHER DISEASES OF THE DIGESTIVE SYSTEM: ICD-10-CM

## 2023-05-05 DIAGNOSIS — Z98.890 OTHER SPECIFIED POSTPROCEDURAL STATES: ICD-10-CM

## 2023-05-05 DIAGNOSIS — Z87.39 PERSONAL HISTORY OF OTHER DISEASES OF THE MUSCULOSKELETAL SYSTEM AND CONNECTIVE TISSUE: ICD-10-CM

## 2023-05-05 PROCEDURE — 99024 POSTOP FOLLOW-UP VISIT: CPT

## 2023-05-05 RX ORDER — ORAL SEMAGLUTIDE 3 MG/1
3 TABLET ORAL DAILY
Refills: 0 | Status: DISCONTINUED | COMMUNITY
End: 2023-05-05

## 2023-05-05 RX ORDER — OXYCODONE AND ACETAMINOPHEN 5; 325 MG/1; MG/1
5-325 TABLET ORAL
Qty: 24 | Refills: 0 | Status: DISCONTINUED | COMMUNITY
Start: 2023-04-02 | End: 2023-05-05

## 2023-05-05 RX ORDER — CEFADROXIL 500 MG/1
500 CAPSULE ORAL TWICE DAILY
Qty: 14 | Refills: 0 | Status: DISCONTINUED | COMMUNITY
Start: 2023-04-02 | End: 2023-05-05

## 2023-05-05 RX ORDER — ONDANSETRON 4 MG/1
4 TABLET, ORALLY DISINTEGRATING ORAL
Qty: 9 | Refills: 0 | Status: DISCONTINUED | COMMUNITY
Start: 2023-04-02 | End: 2023-05-05

## 2023-05-05 NOTE — PHYSICAL EXAM
[Normal Breath Sounds] : Normal breath sounds [Normal Heart Sounds] : normal heart sounds [Normal Rate and Rhythm] : normal rate and rhythm [No HSM] : no hepatosplenomegaly [Tender] : was nontender [Enlarged] : not enlarged [Alert] : alert [Oriented to Person] : oriented to person [Oriented to Place] : oriented to place [Oriented to Time] : oriented to time [Calm] : calm [de-identified] : Appears well, no acute distress, ambulates easily into office.  [de-identified] : Remains normocephalic and atraumatic, as per baseline. [de-identified] : Still supple with full range of motion, as per usual. [FreeTextEntry1] : No palpable or appreciable cervical, supraclavicular, axillary or inguinal adenopathy today.  [de-identified] : Deferred.  [de-identified] : Soft and non tense and non tender.\par New umbilicus clean and dry and intact.\par No fascial laxity or defect on Valsalva.\par Lower abdomen with well-healing panniculectomy incision.\par Left flank drain secure with serous output and fibrinous exudate. [de-identified] : Deferred.  [de-identified] : Deferred.  [de-identified] : Grossly symmetric and within normal limits without motor or sensory deficits.  [de-identified] : See the above abdominal examination.

## 2023-05-05 NOTE — DATA REVIEWED
[FreeTextEntry1] : Patient:   NICOLA PRYOR\par Accession:                             [id number]\par Collected Date/Time:                   4/5/2023 09:45 EDT\par Received Date/Time:                    4/5/2023 12:06 EDT\par Surgical Pathology Report - Auth (Verified)\par Specimen(s) Submitted, Umbilicus\par Final Diagnosis\par 1. Umbilicus, Benign skin and subcutaneous fibroadipose tissue\par Verified by: Cristobal Navas MD, (Electronic Signature)\par Reported on: 04/06/23 10:43 EDT, [facility], [address] [place] [address]\par _________________________________________________________________

## 2023-05-05 NOTE — HISTORY OF PRESENT ILLNESS
[de-identified] : Pt of Dr Winter.  Previously seen by him for incision and drainage of umbilical abscess.  Returns today for dressing change.and wound assessment. [de-identified] : Ms. Diane returns today for scheduled post operative follow-up in excellent spirits.\par She is now s/p inpatient open repair of her incarcerated ventral hernia without mesh and umbilectomy with panniculectomy by Plastic Surgery.\par Ms. Diane is pleased with her continued progress and believes that she has done well.\par She reports only minimal residual discomfort at her drain site.\par Ms. Diane is happy with the appearance of her new umbilicus and the overall resultant contour of the abdominal wall...

## 2023-05-05 NOTE — PLAN
[FreeTextEntry1] : S/P uneventful anesthesia with uncomplicated repair of incarcerated ventral hernia without mesh and umbilectomy.\par Ms. PRYOR is clinically well by this history and surgically stable by this exam.\par No evidence by history or exam to suggest complication.\par Ms. PRYOR  is cleared to resume casual activities with own comfort as her guide.\par To refrain from maximal exertions until cleared by Plastic Surgery.\par Ms. PRYOR has an appointment with Plastic Surgery later today to discuss wound care and drain removal.\par The Pathology report and it's benign nature was reviewed in detail.\par Ms. PRYOR  has been encouraged to call me with any questions or concerns.  \par Otherwise, RTO is now planned as PRN.\par Ms. PRYOR is pleased and agrees.\par She leaves in good spirits, able to verbalize the instructions as outlined above.  I remain available to her.

## 2023-05-09 NOTE — PHYSICAL EXAM
[de-identified] : NAD, AxOx3  [de-identified] : nonlabored breathing  [de-identified] : soft nondistended. Lower abdominal incision is clean, dry and intact. Duncan umbilicus is viable There is no evidence of bleeding, infection or skin breakdown\par Drain is serosanguinous in output. [de-identified] : no edema  [de-identified] : as above  [de-identified] : normal affect

## 2023-05-09 NOTE — HISTORY OF PRESENT ILLNESS
[FreeTextEntry1] : Rachel Diane is a 39 y/o female s/p abdominal panniculectomy/umbilectomy and umbilical hernia repair on 4/5/23. Patient has no complaints today. She admits to some mild discomfort, resolved with pain meds, and denies any significant pain\par As per patient, right drain meets criteria for removal

## 2023-05-11 NOTE — CONSULT NOTE ADULT - TIME BILLING
Patient to IR via cart.   Reviewed with patient in detail, mother at bedside, ER attending and today's RN.

## 2023-05-17 NOTE — HISTORY OF PRESENT ILLNESS
[FreeTextEntry1] : Rachel Diane is a 37 y/o female s/p abdominal panniculectomy/umbilectomy and umbilical hernia repair on 4/5/23. Patient has no complaints today. As per patient left drain still does not meet criteria for removal \par Patient does admit to some irritation at drain site.

## 2023-05-17 NOTE — PHYSICAL EXAM
[de-identified] : NAD, AxOx3  [de-identified] : nonlabored breathing  [de-identified] : soft nondistended. Lower abdominal incision is clean, dry and intact. Duncan umbilicus is viable with some scabbing noted.  There is no evidence of bleeding, infection or skin breakdown. Drain is serous. \par Drain is serosanguinous in output. [de-identified] : no edema  [de-identified] : as above  [de-identified] : normal affect

## 2023-05-19 NOTE — HISTORY OF PRESENT ILLNESS
[FreeTextEntry1] : Rachel Diane is a 37 y/o female s/p abdominal panniculectomy/umbilectomy and umbilical hernia repair on 4/5/23. Patient has no complaints today. Patient is inquiring about dog ear to left lateral incision\par As per patient, left drain meets criteria for removal

## 2023-05-19 NOTE — PHYSICAL EXAM
[de-identified] : NAD, AxOx3  [de-identified] : nonlabored breathing  [de-identified] : soft nondistended. Lower abdominal incision is clean, dry and intact. Duncan umbilicus is viable.   There is no evidence of fluid collection, infection or skin breakdown. There is a left lateral cutaneous deformity abdominal incision\par Drain is serosanguinous in output. [de-identified] : no edema  [de-identified] : as above  [de-identified] : normal affect

## 2023-05-26 ENCOUNTER — APPOINTMENT (OUTPATIENT)
Dept: PLASTIC SURGERY | Facility: CLINIC | Age: 38
End: 2023-05-26
Payer: MEDICAID

## 2023-05-26 PROCEDURE — 99024 POSTOP FOLLOW-UP VISIT: CPT

## 2023-06-16 NOTE — HISTORY OF PRESENT ILLNESS
[FreeTextEntry1] : Rachel Diane is a 37 y/o female s/p abdominal panniculectomy/umbilectomy and umbilical hernia repair on 4/5/23. Patient c/o some firm areas to her mid abdomen. She is also inquiring about dog ear to left lateral incision\par Patient today c/o excess skin to both arms. She states she gets frequent rashes due to the friction, for which she has attempted lotions and compression garments to alleviate the rashes.\par

## 2023-06-16 NOTE — PHYSICAL EXAM
[de-identified] : NAD, AxOx3  [de-identified] : nonlabored breathing  [de-identified] : soft nondistended. Lower abdominal incision is clean, dry and intact. Duncan umbilicus is viable and healing  There was a minimal amount of fat necrosis expressed from the duncan umbilicus. There is no evidence of fluid collection, infection or skin breakdown. There is a left lateral cutaneous deformity abdominal incision\par Drain is serosanguinous in output. [de-identified] : no edema  [de-identified] : as above  [de-identified] : normal affect

## 2023-07-28 ENCOUNTER — APPOINTMENT (OUTPATIENT)
Dept: PLASTIC SURGERY | Facility: CLINIC | Age: 38
End: 2023-07-28
Payer: MEDICAID

## 2023-07-28 DIAGNOSIS — E65 LOCALIZED ADIPOSITY: ICD-10-CM

## 2023-07-28 PROCEDURE — 99212 OFFICE O/P EST SF 10 MIN: CPT | Mod: 25

## 2023-08-27 PROBLEM — E65 ABDOMINAL PANNUS: Status: ACTIVE | Noted: 2023-02-16

## 2023-08-27 NOTE — HISTORY OF PRESENT ILLNESS
[FreeTextEntry1] : Rachel Diane is a 39 y/o female s/p abdominal panniculectomy/umbilectomy and umbilical hernia repair on 4/5/23. Patient c/o some firm areas to her mid abdomen. She is also inquiring about dog ear to left lateral incision and possible revision of her umbilicus. She otherwise has no other complaints today

## 2023-08-27 NOTE — PHYSICAL EXAM
[de-identified] : NAD, AxOx3  [de-identified] : nonlabored breathing  [de-identified] : soft nondistended. Lower abdominal incision is well healed. There is some areas of firmness to mid lower abdomen c/w fat necrosis. Umbilicus is fully effaced but well healed.   There is a left lateral cutaneous deformity present.  [de-identified] : no edema  [de-identified] : as above  [de-identified] : normal affect

## 2023-09-01 ENCOUNTER — APPOINTMENT (OUTPATIENT)
Dept: PLASTIC SURGERY | Facility: CLINIC | Age: 38
End: 2023-09-01
Payer: MEDICAID

## 2023-09-01 DIAGNOSIS — N62 HYPERTROPHY OF BREAST: ICD-10-CM

## 2023-09-01 PROCEDURE — 99215 OFFICE O/P EST HI 40 MIN: CPT

## 2023-09-10 PROBLEM — N62 MACROMASTIA: Status: ACTIVE | Noted: 2023-09-10

## 2023-09-10 NOTE — ADDENDUM
[FreeTextEntry1] : I, Jacobo Do, documented this note as a scribe on behalf of Dr. Lauren Shikowitz-Behr, MD on 09/01/2023.

## 2023-09-10 NOTE — PHYSICAL EXAM
[de-identified] : nonlabored breathing [de-identified] : NAD, AxOX3 [de-identified] : LEFT:  SN-N 37  N-IMF 17  BW   RIGHT:  SN-N 38  N-IMF 18  BW 15 [de-identified] : bird-umbilicus is effaced and flattened [de-identified] : mild bilateral UE skin excess.  RUE circumference 47cm LUE circumference 46.5 cm

## 2023-09-10 NOTE — END OF VISIT
[FreeTextEntry3] : All medical record entries made by the Scribe were at my, Dr. Lauren Shikowitz-Behr, MD, direction and personally dictated by me on 09/01/2023. I have reviewed the chart and agree that the record accurately reflects my personal performance of the history, physical exam, assessment and plan. I have also personally directed, reviewed, and agreed with the chart.

## 2023-09-10 NOTE — HISTORY OF PRESENT ILLNESS
[FreeTextEntry1] : Rachel Diane is a 39 y/o female s/p abdominal panniculectomy/umbilectomy and umbilical hernia repair on 4/5/23. She again inquires about dog ear to left lateral incision and possible revision of her bird- umbilicus. She also complaints symptomatic macromastia, requesting consultation for breast reduction surgery. Patient states that she suffers from long standing neck, back, and shoulder pain, with bra strap grooving from her heavy breasts. She also reports wounds, rashes or sores to beneath her breasts which have failed medical management. She states these symptoms have persisted for more than 6 months and that she has failed treatment with conversative measures. She states that she often has to wears two bras. She is also complaining of excess skin to her bilateral upper extremities which she states is uncomfortable.

## 2023-10-02 ENCOUNTER — NON-APPOINTMENT (OUTPATIENT)
Age: 38
End: 2023-10-02

## 2023-11-14 ENCOUNTER — NON-APPOINTMENT (OUTPATIENT)
Age: 38
End: 2023-11-14

## 2023-12-18 ENCOUNTER — APPOINTMENT (OUTPATIENT)
Dept: PLASTIC SURGERY | Facility: CLINIC | Age: 38
End: 2023-12-18
Payer: MEDICAID

## 2023-12-18 PROCEDURE — 99213 OFFICE O/P EST LOW 20 MIN: CPT

## 2023-12-22 ENCOUNTER — OUTPATIENT (OUTPATIENT)
Dept: OUTPATIENT SERVICES | Facility: HOSPITAL | Age: 38
LOS: 1 days | End: 2023-12-22
Payer: MEDICAID

## 2023-12-22 VITALS
OXYGEN SATURATION: 97 % | HEART RATE: 93 BPM | SYSTOLIC BLOOD PRESSURE: 114 MMHG | RESPIRATION RATE: 18 BRPM | WEIGHT: 222.67 LBS | TEMPERATURE: 97 F | HEIGHT: 63 IN | DIASTOLIC BLOOD PRESSURE: 81 MMHG

## 2023-12-22 DIAGNOSIS — E06.3 AUTOIMMUNE THYROIDITIS: ICD-10-CM

## 2023-12-22 DIAGNOSIS — Z98.84 BARIATRIC SURGERY STATUS: Chronic | ICD-10-CM

## 2023-12-22 DIAGNOSIS — E65 LOCALIZED ADIPOSITY: ICD-10-CM

## 2023-12-22 DIAGNOSIS — L90.5 SCAR CONDITIONS AND FIBROSIS OF SKIN: ICD-10-CM

## 2023-12-22 DIAGNOSIS — Z98.890 OTHER SPECIFIED POSTPROCEDURAL STATES: Chronic | ICD-10-CM

## 2023-12-22 DIAGNOSIS — E11.9 TYPE 2 DIABETES MELLITUS WITHOUT COMPLICATIONS: ICD-10-CM

## 2023-12-22 DIAGNOSIS — F90.9 ATTENTION-DEFICIT HYPERACTIVITY DISORDER, UNSPECIFIED TYPE: ICD-10-CM

## 2023-12-22 DIAGNOSIS — Z01.818 ENCOUNTER FOR OTHER PREPROCEDURAL EXAMINATION: ICD-10-CM

## 2023-12-22 PROCEDURE — 93005 ELECTROCARDIOGRAM TRACING: CPT

## 2023-12-22 PROCEDURE — 80048 BASIC METABOLIC PNL TOTAL CA: CPT

## 2023-12-22 PROCEDURE — 83036 HEMOGLOBIN GLYCOSYLATED A1C: CPT

## 2023-12-22 PROCEDURE — 36415 COLL VENOUS BLD VENIPUNCTURE: CPT

## 2023-12-22 PROCEDURE — 93010 ELECTROCARDIOGRAM REPORT: CPT | Mod: NC

## 2023-12-22 PROCEDURE — 85027 COMPLETE CBC AUTOMATED: CPT

## 2023-12-22 PROCEDURE — G0463: CPT

## 2023-12-22 NOTE — H&P PST ADULT - NSANTHOSAYNRD_GEN_A_CORE
neck 17 inches/No. AILYN screening performed.  STOP BANG Legend: 0-2 = LOW Risk; 3-4 = INTERMEDIATE Risk; 5-8 = HIGH Risk

## 2023-12-22 NOTE — H&P PST ADULT - HISTORY OF PRESENT ILLNESS
39 y/o female with PMH of obesity, type 2 diabetes, hypothyroidism ADHD, ventral hernia for many years, abcess behind umbilicus aspiration done in past, scheduled for ventral hernia repair and related procedures    had panniculectomy and abscess  Patient is a 38 year old F with PMHx of ADHD, Hashimoto's, T2DM presents for perioperative testing for umbilicoplasty abdominal scar revision with Dr. Lauren Shikowitz-Behr scheduled for 01/03/2023. Reports after having panniculectomy, removal of umbilical abscess and ventral hernia repaired, pt has opted for surgical intervention for revision. Denies any acute symptoms at this time. Patient otherwise feels well overall.        Patient is a 38 year old F with PMHx of ADHD, Hashimoto's, T2DM presents for perioperative testing for umbilicoplasty abdominal scar revision with Dr. Lauren Shikowitz-Behr scheduled for 01/03/2024. Reports after having panniculectomy, removal of umbilical abscess and ventral hernia repaired, pt has opted for surgical intervention for revision. Denies any acute symptoms at this time. Patient otherwise feels well overall.

## 2023-12-22 NOTE — H&P PST ADULT - NSICDXFAMILYHX_GEN_ALL_CORE_FT
FAMILY HISTORY:  Family history of ovarian cancer, mother  Family history of prostate cancer in father    Father  Still living? Unknown  FH: CAD (coronary artery disease), Age at diagnosis: Age Unknown

## 2023-12-22 NOTE — H&P PST ADULT - RESPIRATORY
normal/clear to auscultation bilaterally/no wheezes/airway patent/breath sounds equal/good air movement

## 2023-12-22 NOTE — H&P PST ADULT - PRIMARY CARE PROVIDER
[facility]  [facility]  Dr. Harding- [phone] Dr. Harding- [phone]

## 2023-12-22 NOTE — H&P PST ADULT - PROBLEM SELECTOR PLAN 1
Patient provided with pre-operative instructions and verbalized understanding.  Patient can take ________ but otherwise will be NPO on day of surgery. Patient will stop NSAIDs, aspirin, herbal supplements or vitamins 1 week prior to surgery.  Chlorhexidine wash provided with instructions, verbalized understanding.  Pending medical clearance as per surgeon. Patient provided with pre-operative instructions and verbalized understanding.  Patient  will be NPO on day of surgery. Patient will stop NSAIDs, aspirin, herbal supplements or vitamins 1 week prior to surgery.  Chlorhexidine wash provided with instructions, verbalized understanding.      Pending medical clearance as per surgeon.

## 2023-12-22 NOTE — H&P PST ADULT - PROBLEM SELECTOR PLAN 3
continue medications as prescribed.    Hold Jardiance 3-5 days prior to DOS, last dose 12/29  Hold Metformin AM DOS     accucheck STAT DOS

## 2023-12-22 NOTE — H&P PST ADULT - COMMENTS
Denies h/o or family h/o DVT, PE  Denies bleeding or clotting disorders  Denies dentures/partials, loose teeth/caps 93 on ekg

## 2023-12-29 RX ORDER — OXYCODONE 5 MG/1
5 TABLET ORAL
Qty: 12 | Refills: 0 | Status: ACTIVE | COMMUNITY
Start: 2023-12-29 | End: 1900-01-01

## 2023-12-29 RX ORDER — ONDANSETRON 4 MG/1
4 TABLET, ORALLY DISINTEGRATING ORAL
Qty: 9 | Refills: 0 | Status: ACTIVE | COMMUNITY
Start: 2023-12-29 | End: 1900-01-01

## 2023-12-29 RX ORDER — CEFADROXIL 500 MG/1
500 CAPSULE ORAL TWICE DAILY
Qty: 14 | Refills: 0 | Status: ACTIVE | COMMUNITY
Start: 2023-12-29 | End: 1900-01-01

## 2023-12-31 NOTE — PHYSICAL EXAM
[de-identified] : NAD, AxOX3 [de-identified] : nonlabored breathing [de-identified] : LEFT:  SN-N 37  N-IMF 17  BW   RIGHT:  SN-N 38  N-IMF 18  BW 15 [de-identified] : bird-umbilicus is effaced and flattened [de-identified] : mild bilateral UE skin excess.  RUE circumference 47cm LUE circumference 46.5 cm [de-identified] : as above  [de-identified] : normal affect

## 2023-12-31 NOTE — HISTORY OF PRESENT ILLNESS
[FreeTextEntry1] :  Rachel Diane is a 39 y/o female s/p abdominal panniculectomy/umbilectomy and umbilical hernia repair on 4/5/23, here for presurgical discussion of first stage dog ear excision and revision of umbilicoplasty, planned for 1/3/24. Planning for second stage mastopexy and brachioplasty at a later date. Patient is feeling well, with no new complaints.

## 2023-12-31 NOTE — ADDENDUM
[FreeTextEntry1] : I, Jacobo Do, documented this note as a scribe on behalf of Dr. Lauren Shikowitz-Behr, MD on 12/18/2023.

## 2024-01-02 ENCOUNTER — TRANSCRIPTION ENCOUNTER (OUTPATIENT)
Age: 39
End: 2024-01-02

## 2024-01-03 ENCOUNTER — TRANSCRIPTION ENCOUNTER (OUTPATIENT)
Age: 39
End: 2024-01-03

## 2024-01-03 ENCOUNTER — OUTPATIENT (OUTPATIENT)
Dept: OUTPATIENT SERVICES | Facility: HOSPITAL | Age: 39
LOS: 1 days | End: 2024-01-03
Payer: MEDICAID

## 2024-01-03 ENCOUNTER — APPOINTMENT (OUTPATIENT)
Dept: PLASTIC SURGERY | Facility: HOSPITAL | Age: 39
End: 2024-01-03

## 2024-01-03 VITALS
DIASTOLIC BLOOD PRESSURE: 84 MMHG | SYSTOLIC BLOOD PRESSURE: 137 MMHG | OXYGEN SATURATION: 96 % | RESPIRATION RATE: 16 BRPM | TEMPERATURE: 99 F | HEART RATE: 95 BPM

## 2024-01-03 VITALS
HEIGHT: 63 IN | HEART RATE: 72 BPM | DIASTOLIC BLOOD PRESSURE: 81 MMHG | OXYGEN SATURATION: 97 % | TEMPERATURE: 98 F | WEIGHT: 222.67 LBS | SYSTOLIC BLOOD PRESSURE: 135 MMHG | RESPIRATION RATE: 14 BRPM

## 2024-01-03 DIAGNOSIS — L90.5 SCAR CONDITIONS AND FIBROSIS OF SKIN: ICD-10-CM

## 2024-01-03 DIAGNOSIS — Z98.84 BARIATRIC SURGERY STATUS: Chronic | ICD-10-CM

## 2024-01-03 DIAGNOSIS — E65 LOCALIZED ADIPOSITY: ICD-10-CM

## 2024-01-03 DIAGNOSIS — Z98.890 OTHER SPECIFIED POSTPROCEDURAL STATES: Chronic | ICD-10-CM

## 2024-01-03 LAB
GLUCOSE BLDC GLUCOMTR-MCNC: 123 MG/DL — HIGH (ref 70–99)
GLUCOSE BLDC GLUCOMTR-MCNC: 123 MG/DL — HIGH (ref 70–99)

## 2024-01-03 PROCEDURE — 14302 TIS TRNFR ADDL 30 SQ CM: CPT

## 2024-01-03 PROCEDURE — 14301 TIS TRNFR ANY 30.1-60 SQ CM: CPT

## 2024-01-03 PROCEDURE — 14000 TIS TRNFR TRUNK 10 SQ CM/<: CPT | Mod: 59

## 2024-01-03 PROCEDURE — 15200 FTH/GFT FR TRNK 20 SQ CM/<: CPT

## 2024-01-03 PROCEDURE — 82962 GLUCOSE BLOOD TEST: CPT

## 2024-01-03 RX ORDER — SODIUM CHLORIDE 9 MG/ML
1000 INJECTION, SOLUTION INTRAVENOUS
Refills: 0 | Status: DISCONTINUED | OUTPATIENT
Start: 2024-01-03 | End: 2024-01-03

## 2024-01-03 RX ORDER — ONDANSETRON 8 MG/1
4 TABLET, FILM COATED ORAL ONCE
Refills: 0 | Status: DISCONTINUED | OUTPATIENT
Start: 2024-01-03 | End: 2024-01-03

## 2024-01-03 RX ORDER — OXYCODONE HYDROCHLORIDE 5 MG/1
5 TABLET ORAL ONCE
Refills: 0 | Status: DISCONTINUED | OUTPATIENT
Start: 2024-01-03 | End: 2024-01-03

## 2024-01-03 RX ORDER — HYDROMORPHONE HYDROCHLORIDE 2 MG/ML
0.5 INJECTION INTRAMUSCULAR; INTRAVENOUS; SUBCUTANEOUS
Refills: 0 | Status: DISCONTINUED | OUTPATIENT
Start: 2024-01-03 | End: 2024-01-03

## 2024-01-03 RX ORDER — OXYCODONE HYDROCHLORIDE 5 MG/1
10 TABLET ORAL ONCE
Refills: 0 | Status: DISCONTINUED | OUTPATIENT
Start: 2024-01-03 | End: 2024-01-03

## 2024-01-03 RX ORDER — ACETAMINOPHEN 500 MG
650 TABLET ORAL ONCE
Refills: 0 | Status: DISCONTINUED | OUTPATIENT
Start: 2024-01-03 | End: 2024-01-17

## 2024-01-03 NOTE — BRIEF OPERATIVE NOTE - NSICDXBRIEFPROCEDURE_GEN_ALL_CORE_FT
PROCEDURES:  Excision of umbilicus 03-Jan-2024 11:43:42  Sophia Mojica  Application of full-thickness skin graft (FTSG) to torso 03-Jan-2024 11:44:01  Sophia Mojica

## 2024-01-03 NOTE — ASU DISCHARGE PLAN (ADULT/PEDIATRIC) - NS MD DC FALL RISK RISK
For information on Fall & Injury Prevention, visit: [email or web address] OR  [email or web address]-[email or web address] OR  https://www.cdc.gov/steadi/patient.html For information on Fall & Injury Prevention, visit: [email or web address] OR  [email or web address]/[email or web address]-tips-to-avoid-injury OR  https://www.cdc.gov/steadi/patient.html

## 2024-01-03 NOTE — ASU DISCHARGE PLAN (ADULT/PEDIATRIC) - ASU DC SPECIAL INSTRUCTIONSFT
Keep incisions clean and dry for 48hrs. May shower after 48hrs  Keep back to shower water  No heavy lifting or straining Keep all incisions clean and dry until seen in the office. Sponge bathe only  Keep umbilical dressing in place AT ALL TIMES  Keep abdominal binder in place at at times  No heavy lifting or straining

## 2024-01-03 NOTE — ASU PATIENT PROFILE, ADULT - FALL HARM RISK - UNIVERSAL INTERVENTIONS
Bed in lowest position, wheels locked, appropriate side rails in place/Call bell, personal items and telephone in reach/Instruct patient to call for assistance before getting out of bed or chair/Non-slip footwear when patient is out of bed/[place] to call system/Physically safe environment - no spills, clutter or unnecessary equipment/Purposeful Proactive Rounding/Room/bathroom lighting operational, light cord in reach Bed in lowest position, wheels locked, appropriate side rails in place/Call bell, personal items and telephone in reach/Instruct patient to call for assistance before getting out of bed or chair/Non-slip footwear when patient is out of bed/[place] to call system/Physically safe environment - no spills, clutter or unnecessary equipment/Purposeful Proactive Rounding/Room/bathroom lighting operational, light cord in reach

## 2024-01-03 NOTE — ASU DISCHARGE PLAN (ADULT/PEDIATRIC) - BATHING
Keep incisions clean and dry for 48hrs. May shower after 48hrs. Keep incisions clean and dry until seen in the office. SPonge bathe only. Keep umbilical dressing in place at all times

## 2024-01-03 NOTE — H&P ADULT - NSHPREVIEWOFSYSTEMS_GEN_ALL_CORE
85
Constitutional: Denies fever, fatigue or weight loss.  Skin: Denies rash.  Eyes: Denies recent vision problems or eye pain.  ENT: Denies congestion, ear pain, or sore throat.  Endocrine: Denies thyroid problems.  Cardiovascular: Denies chest pain or palpation.  Respiratory: Denies cough, shortness of breath, congestion, or wheezing.  Gastrointestinal: See HPI  Genitourinary: Denies dysuria.  Musculoskeletal: Denies joint swelling.  Neurologic: Denies headache.
72

## 2024-01-03 NOTE — ASU DISCHARGE PLAN (ADULT/PEDIATRIC) - CARE PROVIDER_API CALL
Shikowitz-Behr, Lauren [facility]  Plastic Surgery  [address] [place] [id number]  Phone: [phone]  Fax: [phone]  Follow Up Time:    Shikowitz-Behr, Lauren [facility]  Plastic Surgery  [address] [place] [id number]  Phone: [phone]  Fax: [phone]  Follow Up Time:

## 2024-01-09 ENCOUNTER — APPOINTMENT (OUTPATIENT)
Dept: PLASTIC SURGERY | Facility: CLINIC | Age: 39
End: 2024-01-09
Payer: MEDICAID

## 2024-01-09 VITALS
HEART RATE: 88 BPM | TEMPERATURE: 97.6 F | DIASTOLIC BLOOD PRESSURE: 79 MMHG | BODY MASS INDEX: 43.05 KG/M2 | HEIGHT: 63 IN | SYSTOLIC BLOOD PRESSURE: 120 MMHG | OXYGEN SATURATION: 98 % | WEIGHT: 243 LBS

## 2024-01-09 PROCEDURE — 99024 POSTOP FOLLOW-UP VISIT: CPT

## 2024-01-12 NOTE — PHYSICAL EXAM
[de-identified] : NAD, AxOx3  [de-identified] : nonlabored breathing  [de-identified] : no edema  [de-identified] : soft, nontender central and left abdominal scar revision incisions clean, dry and intact.  expected surgical swelling umbilicoplasty site healing well small fluid collection at the site of the skin graft, which was resolved with dressing changes  graft has good take no sign of infection or open wounds [de-identified] : as above [de-identified] : normal affect

## 2024-01-12 NOTE — ADDENDUM
[FreeTextEntry1] : I, Jacobo Do, documented this note as a scribe on behalf of Dr. Lauren Shikowitz-Behr, MD on 01/09/2024.

## 2024-01-12 NOTE — HISTORY OF PRESENT ILLNESS
[FreeTextEntry1] : Rachel Diane is a 37 y/o female s/p abdominal panniculectomy/umbilectomy and umbilical hernia repair on 4/5/23, now also s/p revision of umbilicoplasty with full-thickness skin graft and abdominal scar revision on 1/3/24.  Patient is feeling well, with some surgical site soreness but otherwise no complaints.

## 2024-01-16 ENCOUNTER — APPOINTMENT (OUTPATIENT)
Dept: PLASTIC SURGERY | Facility: CLINIC | Age: 39
End: 2024-01-16
Payer: MEDICAID

## 2024-01-16 PROCEDURE — 99024 POSTOP FOLLOW-UP VISIT: CPT

## 2024-01-17 NOTE — PHYSICAL EXAM
[de-identified] : NAD, Axox3  [de-identified] : nonlabored breathing  [de-identified] : central and left lateral abdominal incisions are clean, dry and intact  prineo dressing in place  no evidence of infection, fluid collection or skin breakdown umbilicus is clean, dry, and intact  no evidence of infection or breakdown  No drainage  graft with good take  [de-identified] : no edema  [de-identified] : as above  [de-identified] : normal affect

## 2024-01-17 NOTE — ADDENDUM
[FreeTextEntry1] :  I, Yvon Whaley, documented this note as a scribe on behalf of Dr. Lauren Shikowitz-Behr, MD on 01/16/2024.

## 2024-01-17 NOTE — HISTORY OF PRESENT ILLNESS
[FreeTextEntry1] : Rachel Diane is a 37 y/o female s/p revision of umbilicoplasty with full-thickness skin graft and abdominal scar revision on 1/3/24. Patient has been performing xeroform changes to the umbilicus daily. Patient has no complaints. She states she had one episode of some brown drainage from umbilicus

## 2024-01-17 NOTE — END OF VISIT
[FreeTextEntry3] : All medical record entries made by the Scribe were at my, Dr. Lauren Shikowitz-Behr, MD, direction and personally dictated by me on 01/16/2024. I have reviewed the chart and agree that the record accurately reflects my personal performance of the history, physical exam, assessment and plan. I have also personally directed, reviewed, and agreed with the chart.

## 2024-02-02 ENCOUNTER — APPOINTMENT (OUTPATIENT)
Dept: PLASTIC SURGERY | Facility: CLINIC | Age: 39
End: 2024-02-02
Payer: MEDICAID

## 2024-02-02 PROCEDURE — 99024 POSTOP FOLLOW-UP VISIT: CPT

## 2024-02-05 NOTE — ADDENDUM
[FreeTextEntry1] :  I, Yvon Whaley, documented this note as a scribe on behalf of Dr. Lauren Shikowitz-Behr, MD on 02/02/2024.

## 2024-02-05 NOTE — PHYSICAL EXAM
[de-identified] : NAD, Axox3  [de-identified] : nonlabored breathing  [de-identified] : abdominal incision is well healed  clean, dry, and intact  umbilical site with small seroma approx. 1-2 cc of clear yellow fluid drains  there is maceration at the site  no evidence of infection [de-identified] : no edema  [de-identified] : as above  [de-identified] : normal affect

## 2024-02-05 NOTE — END OF VISIT
[FreeTextEntry3] : All medical record entries made by the Scribe were at my, Dr. Lauren Shikowitz-Behr, MD, direction and personally dictated by me on 02/02/2024. I have reviewed the chart and agree that the record accurately reflects my personal performance of the history, physical exam, assessment and plan. I have also personally directed, reviewed, and agreed with the chart.

## 2024-02-05 NOTE — HISTORY OF PRESENT ILLNESS
[FreeTextEntry1] : Rachel Diane is a 39 y/o female s/p revision of umbilicoplasty with full-thickness skin graft and abdominal scar revision on 1/3/24. Patient is feeling well overall. Reports improved drainage from umbilical site.

## 2024-02-16 ENCOUNTER — APPOINTMENT (OUTPATIENT)
Dept: PLASTIC SURGERY | Facility: CLINIC | Age: 39
End: 2024-02-16
Payer: MEDICAID

## 2024-02-16 DIAGNOSIS — L90.5 SCAR CONDITIONS AND FIBROSIS OF SKIN: ICD-10-CM

## 2024-02-16 PROCEDURE — 99024 POSTOP FOLLOW-UP VISIT: CPT

## 2024-02-19 PROBLEM — L90.5 SCAR CONTRACTURE: Status: ACTIVE | Noted: 2023-08-27

## 2024-02-19 NOTE — END OF VISIT
[FreeTextEntry3] : All medical record entries made by the Scribe were at my, Dr. Lauren Shikowitz-Behr, MD, direction and personally dictated by me on 02/16/2024. I have reviewed the chart and agree that the record accurately reflects my personal performance of the history, physical exam, assessment and plan. I have also personally directed, reviewed, and agreed with the chart.

## 2024-02-19 NOTE — ADDENDUM
[FreeTextEntry1] :  I, Yvon Whaley, documented this note as a scribe on behalf of Dr. Lauren Shikowitz-Behr, MD on 02/16/2024.

## 2024-02-19 NOTE — HISTORY OF PRESENT ILLNESS
[FreeTextEntry1] : Rachel Diane is a 39 y/o female s/p revision of umbilicoplasty with full-thickness skin graft and abdominal scar revision on 1/3/24. Patient has no complaints. She feels well and denies any pain.

## 2024-02-19 NOTE — PHYSICAL EXAM
[de-identified] : NAD, AxOx3  [de-identified] : nonlabored breathing  [de-identified] : umbilicus is clean, dry, and intact  skin graft is well healed  no drainage  no evidence of infection, fluid collection [de-identified] : no edema  [de-identified] : as above  [de-identified] : normal affect

## 2024-03-22 ENCOUNTER — APPOINTMENT (OUTPATIENT)
Dept: PLASTIC SURGERY | Facility: CLINIC | Age: 39
End: 2024-03-22

## 2024-03-22 DIAGNOSIS — L57.4 CUTIS LAXA SENILIS: ICD-10-CM

## 2024-03-22 DIAGNOSIS — L98.7 EXCESSIVE AND REDUNDANT SKIN AND SUBCUTANEOUS TISSUE: ICD-10-CM

## 2024-03-22 PROCEDURE — 99214 OFFICE O/P EST MOD 30 MIN: CPT | Mod: 1L,24

## 2024-04-02 NOTE — HISTORY OF PRESENT ILLNESS
[FreeTextEntry1] : NICOLA PRYOR is a 39 year old female who presents today for presurgical discussion. She is scheduled for a bilateral mastopexy and bilateral brachioplasty on 4/22/24. Her insurance denied the brachioplasty procedure, however, she continues to complain of excess skin which interfered with her ADLs, specifically difficulty fitting into clothing and shaving. Patient states she continues to experience frequent rashes due to friction, for which she has attempted lotions and compression garments to alleviate the rashes. Patient states she has lost even more weight, approx. 30 pounds.

## 2024-04-02 NOTE — PHYSICAL EXAM
[de-identified] : NAD, AxOx3  [de-identified] : nonlabored breathing  [de-identified] :  umbilicus is well healed  [de-identified] : as above  [de-identified] : bilateral UE skin excess RUE circumference 47cm LUE circumference 46.5cm  [de-identified] : normal affect

## 2024-04-15 ENCOUNTER — OUTPATIENT (OUTPATIENT)
Dept: OUTPATIENT SERVICES | Facility: HOSPITAL | Age: 39
LOS: 1 days | End: 2024-04-15
Payer: MEDICAID

## 2024-04-15 VITALS
WEIGHT: 206.35 LBS | DIASTOLIC BLOOD PRESSURE: 83 MMHG | SYSTOLIC BLOOD PRESSURE: 127 MMHG | HEART RATE: 83 BPM | HEIGHT: 63 IN | TEMPERATURE: 97 F | RESPIRATION RATE: 18 BRPM | OXYGEN SATURATION: 95 %

## 2024-04-15 DIAGNOSIS — Z98.890 OTHER SPECIFIED POSTPROCEDURAL STATES: Chronic | ICD-10-CM

## 2024-04-15 DIAGNOSIS — E03.9 HYPOTHYROIDISM, UNSPECIFIED: ICD-10-CM

## 2024-04-15 DIAGNOSIS — Z98.84 BARIATRIC SURGERY STATUS: Chronic | ICD-10-CM

## 2024-04-15 DIAGNOSIS — Z87.898 PERSONAL HISTORY OF OTHER SPECIFIED CONDITIONS: ICD-10-CM

## 2024-04-15 DIAGNOSIS — N64.81 PTOSIS OF BREAST: ICD-10-CM

## 2024-04-15 DIAGNOSIS — Z01.818 ENCOUNTER FOR OTHER PREPROCEDURAL EXAMINATION: ICD-10-CM

## 2024-04-15 DIAGNOSIS — E11.9 TYPE 2 DIABETES MELLITUS WITHOUT COMPLICATIONS: ICD-10-CM

## 2024-04-15 DIAGNOSIS — L98.7 EXCESSIVE AND REDUNDANT SKIN AND SUBCUTANEOUS TISSUE: ICD-10-CM

## 2024-04-15 LAB
ANION GAP SERPL CALC-SCNC: 8 MMOL/L — SIGNIFICANT CHANGE UP (ref 5–17)
BLD GP AB SCN SERPL QL: SIGNIFICANT CHANGE UP
BUN SERPL-MCNC: 12 MG/DL — SIGNIFICANT CHANGE UP (ref 7–23)
CALCIUM SERPL-MCNC: 9.5 MG/DL — SIGNIFICANT CHANGE UP (ref 8.4–10.5)
CHLORIDE SERPL-SCNC: 100 MMOL/L — SIGNIFICANT CHANGE UP (ref 96–108)
CO2 SERPL-SCNC: 28 MMOL/L — SIGNIFICANT CHANGE UP (ref 22–31)
CREAT SERPL-MCNC: 0.87 MG/DL — SIGNIFICANT CHANGE UP (ref 0.5–1.3)
EGFR: 86 ML/MIN/1.73M2 — SIGNIFICANT CHANGE UP
GLUCOSE SERPL-MCNC: 101 MG/DL — HIGH (ref 70–99)
HCT VFR BLD CALC: 42.5 % — SIGNIFICANT CHANGE UP (ref 34.5–45)
HGB BLD-MCNC: 13.4 G/DL — SIGNIFICANT CHANGE UP (ref 11.5–15.5)
MCHC RBC-ENTMCNC: 25.6 PG — LOW (ref 27–34)
MCHC RBC-ENTMCNC: 31.5 GM/DL — LOW (ref 32–36)
MCV RBC AUTO: 81.3 FL — SIGNIFICANT CHANGE UP (ref 80–100)
NRBC # BLD: 0 /100 WBCS — SIGNIFICANT CHANGE UP (ref 0–0)
PLATELET # BLD AUTO: 282 K/UL — SIGNIFICANT CHANGE UP (ref 150–400)
POTASSIUM SERPL-MCNC: 4 MMOL/L — SIGNIFICANT CHANGE UP (ref 3.5–5.3)
POTASSIUM SERPL-SCNC: 4 MMOL/L — SIGNIFICANT CHANGE UP (ref 3.5–5.3)
RBC # BLD: 5.23 M/UL — HIGH (ref 3.8–5.2)
RBC # FLD: 14.5 % — SIGNIFICANT CHANGE UP (ref 10.3–14.5)
SODIUM SERPL-SCNC: 136 MMOL/L — SIGNIFICANT CHANGE UP (ref 135–145)
WBC # BLD: 6.59 K/UL — SIGNIFICANT CHANGE UP (ref 3.8–10.5)
WBC # FLD AUTO: 6.59 K/UL — SIGNIFICANT CHANGE UP (ref 3.8–10.5)

## 2024-04-15 NOTE — H&P PST ADULT - NSICDXPASTSURGICALHX_GEN_ALL_CORE_FT
PAST SURGICAL HISTORY:  H/O laparoscopic adjustable gastric banding 2012    H/O umbilical hernia repair     History of removal of laparoscopic gastric banding device     History of wisdom tooth extraction 2008    S/P hernia repair     Status post panniculectomy

## 2024-04-15 NOTE — H&P PST ADULT - NSICDXFAMILYHX_GEN_ALL_CORE_FT
FAMILY HISTORY:  Family history of ovarian cancer, mother  Family history of prostate cancer in father    Father  Still living? Unknown  FH: CAD (coronary artery disease), Age at diagnosis: Age Unknown  FH: hypertension, Age at diagnosis: Age Unknown

## 2024-04-15 NOTE — H&P PST ADULT - NSANTHOSAYNRD_GEN_A_CORE
neck 14.5 inches/No. AILYN screening performed.  STOP BANG Legend: 0-2 = LOW Risk; 3-4 = INTERMEDIATE Risk; 5-8 = HIGH Risk

## 2024-04-15 NOTE — H&P PST ADULT - HISTORY OF PRESENT ILLNESS
39 year old F with PMHx of ADHD, Hashimoto's, T2DM presents for PST.  Scheduled for bilateral mastopexy with Dr Shikowitz-Behr on 04/22/2024.     39 year old F with PMHx of ADHD, Hashimoto's, and T2DM presents for PST.   C/o neck and shoulder pain due to breasts. She was evaluated by surgeon and recommended for upcoming procedure.  Otherwise feeling well at [facility] today.   Scheduled for bilateral mastopexy with Dr Shikowitz-Behr on 04/22/2024.

## 2024-04-15 NOTE — H&P PST ADULT - PROBLEM SELECTOR PLAN 1
Scheduled for bilateral mastopexy with Dr Shikowitz-Behr on 04/22/2024.  Pre op instructions given and patient verbalized understanding.  CBC, BMP, HgbA1c, T&S and medical clearance pending.  NPO after midnight night before procedure.  May take levothyroxine AM of procedure with small sip of water.  To stop all ASA, NSAIDs, vitamins and supplements 1 week prior to procedure.  Chlorhexidene wash given with instructions

## 2024-04-15 NOTE — H&P PST ADULT - NSICDXPASTMEDICALHX_GEN_ALL_CORE_FT
PAST MEDICAL HISTORY:  ADHD     Closed fracture of wrist left wrist at age 6    DM (diabetes mellitus)     Fracture of clavicle at age 4.    Hashimoto's disease     History of ventral hernia     Hypothyroidism     Ptosis of breast     Umbilical hernia

## 2024-04-15 NOTE — H&P PST ADULT - PROBLEM SELECTOR PLAN 3
Takes medications as prescribed  No Jardiance 3-5 days pre op  No Metformin 24 hours prior to procedure   Accu check pre op

## 2024-04-16 ENCOUNTER — APPOINTMENT (OUTPATIENT)
Dept: PLASTIC SURGERY | Facility: CLINIC | Age: 39
End: 2024-04-16
Payer: MEDICAID

## 2024-04-16 LAB
A1C WITH ESTIMATED AVERAGE GLUCOSE RESULT: 6.2 % — HIGH (ref 4–5.6)
ESTIMATED AVERAGE GLUCOSE: 131 MG/DL — HIGH (ref 68–114)

## 2024-04-16 PROCEDURE — 99212 OFFICE O/P EST SF 10 MIN: CPT

## 2024-04-18 PROCEDURE — 36415 COLL VENOUS BLD VENIPUNCTURE: CPT

## 2024-04-18 PROCEDURE — 83036 HEMOGLOBIN GLYCOSYLATED A1C: CPT

## 2024-04-18 PROCEDURE — G0463: CPT

## 2024-04-18 PROCEDURE — 86850 RBC ANTIBODY SCREEN: CPT

## 2024-04-18 PROCEDURE — 86900 BLOOD TYPING SEROLOGIC ABO: CPT

## 2024-04-18 PROCEDURE — 86901 BLOOD TYPING SEROLOGIC RH(D): CPT

## 2024-04-18 PROCEDURE — 80048 BASIC METABOLIC PNL TOTAL CA: CPT

## 2024-04-18 PROCEDURE — 85027 COMPLETE CBC AUTOMATED: CPT

## 2024-04-19 RX ORDER — ONDANSETRON 4 MG/1
4 TABLET, ORALLY DISINTEGRATING ORAL
Qty: 9 | Refills: 0 | Status: ACTIVE | COMMUNITY
Start: 2024-04-19 | End: 1900-01-01

## 2024-04-19 RX ORDER — OXYCODONE 5 MG/1
5 TABLET ORAL
Qty: 12 | Refills: 0 | Status: ACTIVE | COMMUNITY
Start: 2024-04-19 | End: 1900-01-01

## 2024-04-19 RX ORDER — CEFADROXIL 500 MG/1
500 CAPSULE ORAL TWICE DAILY
Qty: 14 | Refills: 0 | Status: ACTIVE | COMMUNITY
Start: 2024-04-19 | End: 1900-01-01

## 2024-04-21 ENCOUNTER — TRANSCRIPTION ENCOUNTER (OUTPATIENT)
Age: 39
End: 2024-04-21

## 2024-04-22 ENCOUNTER — RESULT REVIEW (OUTPATIENT)
Age: 39
End: 2024-04-22

## 2024-04-22 ENCOUNTER — APPOINTMENT (OUTPATIENT)
Dept: PLASTIC SURGERY | Facility: HOSPITAL | Age: 39
End: 2024-04-22

## 2024-04-22 ENCOUNTER — TRANSCRIPTION ENCOUNTER (OUTPATIENT)
Age: 39
End: 2024-04-22

## 2024-04-22 ENCOUNTER — OUTPATIENT (OUTPATIENT)
Dept: OUTPATIENT SERVICES | Facility: HOSPITAL | Age: 39
LOS: 1 days | End: 2024-04-22
Payer: MEDICAID

## 2024-04-22 DIAGNOSIS — Z98.84 BARIATRIC SURGERY STATUS: Chronic | ICD-10-CM

## 2024-04-22 DIAGNOSIS — Z98.890 OTHER SPECIFIED POSTPROCEDURAL STATES: Chronic | ICD-10-CM

## 2024-04-22 PROCEDURE — 88305 TISSUE EXAM BY PATHOLOGIST: CPT | Mod: 26

## 2024-04-22 RX ORDER — METFORMIN HYDROCHLORIDE 850 MG/1
500 TABLET ORAL
Qty: 0 | Refills: 0 | DISCHARGE

## 2024-04-22 RX ORDER — IBUPROFEN 200 MG
1 TABLET ORAL
Qty: 0 | Refills: 0 | DISCHARGE

## 2024-04-22 RX ORDER — METHYLPHENIDATE HCL 5 MG
5 TABLET ORAL
Qty: 0 | Refills: 0 | DISCHARGE

## 2024-04-22 RX ORDER — METHYLPHENIDATE HCL 5 MG
1 TABLET ORAL
Qty: 0 | Refills: 0 | DISCHARGE

## 2024-04-22 RX ORDER — ERGOCALCIFEROL 1.25 MG/1
0 CAPSULE ORAL
Refills: 0 | DISCHARGE

## 2024-04-22 RX ORDER — LEVOTHYROXINE SODIUM 125 MCG
1 TABLET ORAL
Qty: 0 | Refills: 0 | DISCHARGE

## 2024-04-22 RX ORDER — LISDEXAMFETAMINE DIMESYLATE 70 MG/1
1 CAPSULE ORAL
Refills: 0 | DISCHARGE

## 2024-04-22 RX ORDER — METFORMIN HYDROCHLORIDE 850 MG/1
1 TABLET ORAL
Refills: 0 | DISCHARGE

## 2024-04-22 RX ORDER — ERTUGLIFLOZIN 5 MG/1
1 TABLET, FILM COATED ORAL
Qty: 0 | Refills: 0 | DISCHARGE

## 2024-04-22 RX ORDER — EMPAGLIFLOZIN 10 MG/1
1 TABLET, FILM COATED ORAL
Refills: 0 | DISCHARGE

## 2024-04-24 NOTE — HISTORY OF PRESENT ILLNESS
[FreeTextEntry1] : NICOLA PRYOR is a 39 year old female who presents today for presurgical discussion. She is scheduled for a bilateral mastopexy on 4/22/24.  She continues to complain of excess skin which interfered with her ADLs, specifically difficulty fitting into clothing and shaving. Patient states she continues to experience frequent rashes due to friction, for which she has attempted lotions and compression garments to alleviate the rashes. Patient states she has lost even more weight, approx. 30 pounds.

## 2024-04-24 NOTE — PHYSICAL EXAM
[de-identified] : NAD, AxOx3  [de-identified] : nonlabored breathing  [de-identified] : no masses, no nipple discharge nor retraction LEFT: SN-N 37, N-IMF 17, BW 15 RIGHT: SN-N 38, N-IMF 18, BW 15 [de-identified] : umbilicus is well healed  [de-identified] : bilateral UE skin excess RUE circumference 47cm LUE circumference 46.5cm  [de-identified] : as above  [de-identified] : normal affect

## 2024-04-26 DIAGNOSIS — Z87.898 PERSONAL HISTORY OF OTHER SPECIFIED CONDITIONS: ICD-10-CM

## 2024-04-30 ENCOUNTER — APPOINTMENT (OUTPATIENT)
Dept: PLASTIC SURGERY | Facility: CLINIC | Age: 39
End: 2024-04-30
Payer: MEDICAID

## 2024-04-30 PROBLEM — N64.81 PTOSIS OF BREAST: Chronic | Status: ACTIVE | Noted: 2024-04-15

## 2024-04-30 PROBLEM — Z87.19 PERSONAL HISTORY OF OTHER DISEASES OF THE DIGESTIVE SYSTEM: Chronic | Status: ACTIVE | Noted: 2024-04-22

## 2024-04-30 PROCEDURE — C9399: CPT

## 2024-04-30 PROCEDURE — 19316 MASTOPEXY: CPT | Mod: 50

## 2024-04-30 PROCEDURE — 99024 POSTOP FOLLOW-UP VISIT: CPT

## 2024-04-30 PROCEDURE — 82962 GLUCOSE BLOOD TEST: CPT

## 2024-04-30 PROCEDURE — 88305 TISSUE EXAM BY PATHOLOGIST: CPT

## 2024-04-30 NOTE — ADDENDUM
[FreeTextEntry1] :  I, Yvon Whaley, documented this note as a scribe on behalf of Dr. Lauren Shikowitz-Behr, MD on 04/30/2024.

## 2024-04-30 NOTE — END OF VISIT
[FreeTextEntry3] : All medical record entries made by the Scribe were at my, Dr. Lauren Shikowitz-Behr, MD, direction and personally dictated by me on 04/30/2024. I have reviewed the chart and agree that the record accurately reflects my personal performance of the history, physical exam, assessment and plan. I have also personally directed, reviewed, and agreed with the chart.

## 2024-04-30 NOTE — HISTORY OF PRESENT ILLNESS
[FreeTextEntry1] : NICOLA PRYOR is a 39 year old female who presents today for first postoperative visit s/p bilateral mastopexy on 4/22/24. Patient complaining about post op constipation in first few days but now improved. She as well is  questioning about the lateral dog ear deformities.

## 2024-04-30 NOTE — PHYSICAL EXAM
[de-identified] : NAD, AxOx3  [de-identified] : nonlabored breathing  [de-identified] : bilateral breast incisions clean, dry, and intact  NAC viable bilaterally  some mild irriation and moisture along the IMF bilaterally  expected swelling and ecchymosis present  no evidence of infection, hematoma or skin breakdown   [de-identified] : no edema  [de-identified] : as above  [de-identified] : normal affect

## 2024-05-07 ENCOUNTER — APPOINTMENT (OUTPATIENT)
Dept: PLASTIC SURGERY | Facility: CLINIC | Age: 39
End: 2024-05-07
Payer: MEDICAID

## 2024-05-07 PROCEDURE — 99024 POSTOP FOLLOW-UP VISIT: CPT

## 2024-05-09 NOTE — END OF VISIT
[FreeTextEntry3] : All medical record entries made by the Scribe were at my, Dr. Lauren Shikowitz-Behr, MD, direction and personally dictated by me on 05/07/2024. I have reviewed the chart and agree that the record accurately reflects my personal performance of the history, physical exam, assessment and plan. I have also personally directed, reviewed, and agreed with the chart.

## 2024-05-09 NOTE — HISTORY OF PRESENT ILLNESS
[FreeTextEntry1] : NICOLA RPYOR is a 39 year old female who presents today s/p bilateral mastopexy on 4/22/24. She is complaining about some itching. She states she is using xeroform dressing changes daily to both breasts.

## 2024-05-09 NOTE — PHYSICAL EXAM
[de-identified] : NAD, AxOX3  [de-identified] : nonlabored breathing  [de-identified] : Bilateral breast incisions clean, dry, and intact  NAC viable bilaterally  Some swelling of upper pole right breast  irritation noted at right T point, no open wounds  no evidence of infection, fluid collection, or skin breakdown  [de-identified] : no edema  [de-identified] : as above [de-identified] : normal affect

## 2024-05-09 NOTE — ADDENDUM
[FreeTextEntry1] :  I, Yvon Whaley, documented this note as a scribe on behalf of Dr. Lauren Shikowitz-Behr, MD on 05/07/2024.

## 2024-05-14 ENCOUNTER — APPOINTMENT (OUTPATIENT)
Dept: PLASTIC SURGERY | Facility: CLINIC | Age: 39
End: 2024-05-14
Payer: MEDICAID

## 2024-05-14 PROCEDURE — 99024 POSTOP FOLLOW-UP VISIT: CPT

## 2024-05-19 NOTE — HISTORY OF PRESENT ILLNESS
[FreeTextEntry1] : NICOLA PRYOR is a 39 year old female who presents today s/p bilateral mastopexy on 4/22/24. She is complaining about some itching. She is inquiring about lateral fullness in both breasts.

## 2024-05-19 NOTE — PHYSICAL EXAM
[de-identified] : NAD, Axox3  [de-identified] : nonlabored breathing  [de-identified] : bilateral breast incision clean, dry, and intact  NAC viable bilaterally  Left Tpoint is closed  Small Tpoint opening on right  No evidence of infection  Lateral cutaneous deformities bilaterally  [de-identified] : no edema  [de-identified] : as above [de-identified] : normal affect

## 2024-05-19 NOTE — ADDENDUM
[FreeTextEntry1] :  I, Yvon Whaley, documented this note as a scribe on behalf of Dr. Lauren Shikowitz-Behr, MD on 05/14/2024.

## 2024-05-28 ENCOUNTER — APPOINTMENT (OUTPATIENT)
Dept: PLASTIC SURGERY | Facility: CLINIC | Age: 39
End: 2024-05-28
Payer: MEDICAID

## 2024-05-28 PROCEDURE — 99024 POSTOP FOLLOW-UP VISIT: CPT

## 2024-05-28 RX ORDER — CEFADROXIL 500 MG/1
500 CAPSULE ORAL TWICE DAILY
Qty: 10 | Refills: 0 | Status: ACTIVE | COMMUNITY
Start: 2024-05-28 | End: 1900-01-01

## 2024-05-29 NOTE — HISTORY OF PRESENT ILLNESS
[FreeTextEntry1] : NICOLA PRYOR is a 39 year old female who presents today s/p bilateral mastopexy on 4/22/24. Patient called the office last week stating she had some  wound separation. She was instructed to start wet-to-dry wound dressing at the site. Patient with no complaints today.

## 2024-05-29 NOTE — PHYSICAL EXAM
[de-identified] : NAD, AxOx3  [de-identified] : nonlabored breathing  [de-identified] : left breast incision clean, dry, and intact  NAC viable bilaterally  Minimal scabbing  Right breast with approximately 1.5cm Tpoint wound and tunneling superiorly and medially about 1cm  No evidence of infection, no drainage.  Tpoint is granulating.  [de-identified] : as above [de-identified] : no edema  [de-identified] : normal affect

## 2024-05-29 NOTE — END OF VISIT
[FreeTextEntry3] : All medical record entries made by the Scribe were at my, Dr. Lauren Shikowitz-Behr, MD, direction and personally dictated by me on 05/28/2024. I have reviewed the chart and agree that the record accurately reflects my personal performance of the history, physical exam, assessment and plan. I have also personally directed, reviewed, and agreed with the chart.

## 2024-05-29 NOTE — ADDENDUM
[FreeTextEntry1] :  I, Yvon Whaley, documented this note as a scribe on behalf of Dr. Lauren Shikowitz-Behr, MD on 05/28/2024.

## 2024-06-04 ENCOUNTER — APPOINTMENT (OUTPATIENT)
Dept: PLASTIC SURGERY | Facility: CLINIC | Age: 39
End: 2024-06-04
Payer: MEDICAID

## 2024-06-04 PROCEDURE — 99024 POSTOP FOLLOW-UP VISIT: CPT

## 2024-06-10 NOTE — HISTORY OF PRESENT ILLNESS
[FreeTextEntry1] : NICOLA PRYOR is a 39 year old female who presents today s/p bilateral mastopexy on 4/22/24. She has been applying wet to dress dressings to the right breast wound. Patient with no new complaints.

## 2024-06-10 NOTE — PHYSICAL EXAM
[de-identified] : NAD, AxOx3 [de-identified] : nonlabored breathing [de-identified] : right breast Tpoint wound 1cm x 1cm  Granulating well  No tunneling or undermining  left breast with superficial Tpoint wound 2mm no evidence of infection rest of incisions healing well [de-identified] : no edema [de-identified] : as above [de-identified] : normal affect

## 2024-06-10 NOTE — ADDENDUM
[FreeTextEntry1] :  I, Yvon Whaley, documented this note as a scribe on behalf of Dr. Lauren Shikowitz-Behr, MD on 06/04/2024.

## 2024-06-11 ENCOUNTER — APPOINTMENT (OUTPATIENT)
Dept: PLASTIC SURGERY | Facility: CLINIC | Age: 39
End: 2024-06-11
Payer: MEDICAID

## 2024-06-11 PROCEDURE — 99024 POSTOP FOLLOW-UP VISIT: CPT

## 2024-06-11 NOTE — END OF VISIT
[FreeTextEntry3] : All medical record entries made by the Scribe were at my, Dr. Lauren Shikowitz-Behr, MD, direction and personally dictated by me on 06/11/2024. I have reviewed the chart and agree that the record accurately reflects my personal performance of the history, physical exam, assessment and plan. I have also personally directed, reviewed, and agreed with the chart.

## 2024-06-11 NOTE — PHYSICAL EXAM
[de-identified] : NAD, AxOx3  None [de-identified] : nonlabored breathing  [de-identified] : no edema  [de-identified] : Right breast incision is healing well right Tpoint wound with epithelization  Left breast incision healing well 0.25cm Tpoint wound  Clean no evidence of infection or fluid collection  [de-identified] : as above  [de-identified] : normal affect

## 2024-06-11 NOTE — HISTORY OF PRESENT ILLNESS
[FreeTextEntry1] : NICOLA PRYOR is a 39 year old female who presents today s/p bilateral mastopexy on 4/22/24. Patient with no complaints today. She admits she is currently performing wet to dry dressing changes during the day and keeping the area dry at night.

## 2024-06-11 NOTE — ADDENDUM
[FreeTextEntry1] :  I, Yvon Whaley, documented this note as a scribe on behalf of Dr. Lauren Shikowitz-Behr, MD on 06/11/2024.

## 2024-06-25 ENCOUNTER — APPOINTMENT (OUTPATIENT)
Dept: PLASTIC SURGERY | Facility: CLINIC | Age: 39
End: 2024-06-25
Payer: MEDICAID

## 2024-06-25 DIAGNOSIS — N64.81 PTOSIS OF BREAST: ICD-10-CM

## 2024-06-25 DIAGNOSIS — Z87.898 PERSONAL HISTORY OF OTHER SPECIFIED CONDITIONS: ICD-10-CM

## 2024-06-25 PROCEDURE — 99024 POSTOP FOLLOW-UP VISIT: CPT

## 2024-06-30 PROBLEM — N64.81 BREAST PTOSIS: Status: ACTIVE | Noted: 2023-09-10

## 2024-06-30 PROBLEM — Z87.898 HISTORY OF WEIGHT LOSS: Status: ACTIVE | Noted: 2023-08-27

## 2024-10-10 NOTE — PHYSICAL EXAM
Form signed faxed and sent to scanning.   [Obese, well nourished, in no acute distress] : obese, well nourished, in no acute distress [Normal] : grossly intact [de-identified] : normoactive bowel sounds, soft and non tender, no hepatosplenomegaly or masses appreciated.

## 2025-01-21 ENCOUNTER — APPOINTMENT (OUTPATIENT)
Dept: PLASTIC SURGERY | Facility: CLINIC | Age: 40
End: 2025-01-21

## 2025-03-12 ENCOUNTER — APPOINTMENT (OUTPATIENT)
Dept: PLASTIC SURGERY | Facility: CLINIC | Age: 40
End: 2025-03-12

## 2025-03-12 VITALS
OXYGEN SATURATION: 98 % | WEIGHT: 245 LBS | DIASTOLIC BLOOD PRESSURE: 74 MMHG | SYSTOLIC BLOOD PRESSURE: 129 MMHG | HEART RATE: 86 BPM | HEIGHT: 63 IN | BODY MASS INDEX: 43.41 KG/M2

## 2025-03-12 DIAGNOSIS — L90.5 SCAR CONDITIONS AND FIBROSIS OF SKIN: ICD-10-CM

## 2025-03-12 DIAGNOSIS — Z87.898 PERSONAL HISTORY OF OTHER SPECIFIED CONDITIONS: ICD-10-CM

## 2025-03-12 DIAGNOSIS — N62 HYPERTROPHY OF BREAST: ICD-10-CM

## 2025-03-12 PROCEDURE — 99213 OFFICE O/P EST LOW 20 MIN: CPT

## 2025-04-24 NOTE — H&P PST ADULT - MAMMOGRAM, RESULTS OF LAST, PROFILE
Problem: At Risk for Falls  Goal: Patient does not fall  Outcome: Adequate for discharge  Goal: Patient takes action to control fall-related risks  Outcome: Adequate for discharge     Problem: Angina/Chest Pain  Goal: # Achieves Chest Pain Control (Pain Score = 0-1, no episodes)  Description: Chest pain control = Pain Score = 0-1, no episodes of pain  Outcome: Adequate for discharge  Goal: Anxiety is controlled  Outcome: Adequate for discharge  Goal: # Verbalizes understanding of symptoms, diagnosis, and treatment  Description: Document on Patient Education Activity  Outcome: Adequate for discharge      as per pt, normal

## 2025-05-07 ENCOUNTER — NON-APPOINTMENT (OUTPATIENT)
Age: 40
End: 2025-05-07

## 2025-07-01 ENCOUNTER — APPOINTMENT (OUTPATIENT)
Dept: PLASTIC SURGERY | Facility: CLINIC | Age: 40
End: 2025-07-01

## 2025-07-01 VITALS
DIASTOLIC BLOOD PRESSURE: 82 MMHG | BODY MASS INDEX: 45.36 KG/M2 | SYSTOLIC BLOOD PRESSURE: 121 MMHG | OXYGEN SATURATION: 98 % | HEART RATE: 72 BPM | HEIGHT: 63 IN | TEMPERATURE: 98.1 F | WEIGHT: 256 LBS

## 2025-07-01 PROCEDURE — 13102 CMPLX RPR TRUNK ADDL 5CM/<: CPT

## 2025-07-01 PROCEDURE — 13101 CMPLX RPR TRUNK 2.6-7.5 CM: CPT

## 2025-07-15 ENCOUNTER — APPOINTMENT (OUTPATIENT)
Dept: PLASTIC SURGERY | Facility: CLINIC | Age: 40
End: 2025-07-15

## 2025-07-15 PROCEDURE — 99024 POSTOP FOLLOW-UP VISIT: CPT

## 2025-07-18 ENCOUNTER — EMERGENCY (EMERGENCY)
Facility: HOSPITAL | Age: 40
LOS: 1 days | End: 2025-07-18
Attending: STUDENT IN AN ORGANIZED HEALTH CARE EDUCATION/TRAINING PROGRAM
Payer: COMMERCIAL

## 2025-07-18 VITALS
TEMPERATURE: 98 F | DIASTOLIC BLOOD PRESSURE: 84 MMHG | SYSTOLIC BLOOD PRESSURE: 142 MMHG | RESPIRATION RATE: 18 BRPM | OXYGEN SATURATION: 98 % | HEART RATE: 93 BPM

## 2025-07-18 PROCEDURE — 99053 MED SERV 10PM-8AM 24 HR FAC: CPT

## 2025-07-18 PROCEDURE — 99284 EMERGENCY DEPT VISIT MOD MDM: CPT

## 2025-07-19 VITALS
TEMPERATURE: 98 F | SYSTOLIC BLOOD PRESSURE: 99 MMHG | RESPIRATION RATE: 18 BRPM | HEART RATE: 93 BPM | DIASTOLIC BLOOD PRESSURE: 64 MMHG | OXYGEN SATURATION: 99 %

## 2025-07-19 LAB
ALBUMIN SERPL ELPH-MCNC: 4.1 G/DL — SIGNIFICANT CHANGE UP (ref 3.3–5)
ALP SERPL-CCNC: 58 U/L — SIGNIFICANT CHANGE UP (ref 40–120)
ALT FLD-CCNC: 23 U/L — SIGNIFICANT CHANGE UP (ref 10–45)
ANION GAP SERPL CALC-SCNC: 14 MMOL/L — SIGNIFICANT CHANGE UP (ref 5–17)
APPEARANCE UR: CLEAR — SIGNIFICANT CHANGE UP
AST SERPL-CCNC: 13 U/L — SIGNIFICANT CHANGE UP (ref 10–40)
BASOPHILS # BLD AUTO: 0.04 K/UL — SIGNIFICANT CHANGE UP (ref 0–0.2)
BASOPHILS NFR BLD AUTO: 0.3 % — SIGNIFICANT CHANGE UP (ref 0–2)
BILIRUB SERPL-MCNC: 0.2 MG/DL — SIGNIFICANT CHANGE UP (ref 0.2–1.2)
BILIRUB UR-MCNC: NEGATIVE — SIGNIFICANT CHANGE UP
BLD GP AB SCN SERPL QL: NEGATIVE — SIGNIFICANT CHANGE UP
BUN SERPL-MCNC: 10 MG/DL — SIGNIFICANT CHANGE UP (ref 7–23)
CALCIUM SERPL-MCNC: 9.9 MG/DL — SIGNIFICANT CHANGE UP (ref 8.4–10.5)
CHLORIDE SERPL-SCNC: 101 MMOL/L — SIGNIFICANT CHANGE UP (ref 96–108)
CO2 SERPL-SCNC: 21 MMOL/L — LOW (ref 22–31)
COLOR SPEC: YELLOW — SIGNIFICANT CHANGE UP
CREAT SERPL-MCNC: 0.76 MG/DL — SIGNIFICANT CHANGE UP (ref 0.5–1.3)
DIFF PNL FLD: NEGATIVE — SIGNIFICANT CHANGE UP
EGFR: 102 ML/MIN/1.73M2 — SIGNIFICANT CHANGE UP
EGFR: 102 ML/MIN/1.73M2 — SIGNIFICANT CHANGE UP
EOSINOPHIL # BLD AUTO: 0.09 K/UL — SIGNIFICANT CHANGE UP (ref 0–0.5)
EOSINOPHIL NFR BLD AUTO: 0.8 % — SIGNIFICANT CHANGE UP (ref 0–6)
GLUCOSE SERPL-MCNC: 153 MG/DL — HIGH (ref 70–99)
GLUCOSE UR QL: NEGATIVE MG/DL — SIGNIFICANT CHANGE UP
HCG SERPL-ACNC: HIGH MIU/ML
HCT VFR BLD CALC: 39.2 % — SIGNIFICANT CHANGE UP (ref 34.5–45)
HGB BLD-MCNC: 12.5 G/DL — SIGNIFICANT CHANGE UP (ref 11.5–15.5)
IMM GRANULOCYTES # BLD AUTO: 0.02 K/UL — SIGNIFICANT CHANGE UP (ref 0–0.07)
IMM GRANULOCYTES NFR BLD AUTO: 0.2 % — SIGNIFICANT CHANGE UP (ref 0–0.9)
KETONES UR QL: NEGATIVE MG/DL — SIGNIFICANT CHANGE UP
LEUKOCYTE ESTERASE UR-ACNC: NEGATIVE — SIGNIFICANT CHANGE UP
LYMPHOCYTES # BLD AUTO: 4.1 K/UL — HIGH (ref 1–3.3)
LYMPHOCYTES NFR BLD AUTO: 35.5 % — SIGNIFICANT CHANGE UP (ref 13–44)
MCHC RBC-ENTMCNC: 26.2 PG — LOW (ref 27–34)
MCHC RBC-ENTMCNC: 31.9 G/DL — LOW (ref 32–36)
MCV RBC AUTO: 82.2 FL — SIGNIFICANT CHANGE UP (ref 80–100)
MONOCYTES # BLD AUTO: 0.65 K/UL — SIGNIFICANT CHANGE UP (ref 0–0.9)
MONOCYTES NFR BLD AUTO: 5.6 % — SIGNIFICANT CHANGE UP (ref 2–14)
NEUTROPHILS # BLD AUTO: 6.66 K/UL — SIGNIFICANT CHANGE UP (ref 1.8–7.4)
NEUTROPHILS NFR BLD AUTO: 57.6 % — SIGNIFICANT CHANGE UP (ref 43–77)
NITRITE UR-MCNC: NEGATIVE — SIGNIFICANT CHANGE UP
NRBC # BLD AUTO: 0 K/UL — SIGNIFICANT CHANGE UP (ref 0–0)
NRBC # FLD: 0 K/UL — SIGNIFICANT CHANGE UP (ref 0–0)
NRBC BLD AUTO-RTO: 0 /100 WBCS — SIGNIFICANT CHANGE UP (ref 0–0)
PH UR: 6.5 — SIGNIFICANT CHANGE UP (ref 5–8)
PLATELET # BLD AUTO: 238 K/UL — SIGNIFICANT CHANGE UP (ref 150–400)
PMV BLD: 11.4 FL — SIGNIFICANT CHANGE UP (ref 7–13)
POTASSIUM SERPL-MCNC: 4 MMOL/L — SIGNIFICANT CHANGE UP (ref 3.5–5.3)
POTASSIUM SERPL-SCNC: 4 MMOL/L — SIGNIFICANT CHANGE UP (ref 3.5–5.3)
PROT SERPL-MCNC: 7 G/DL — SIGNIFICANT CHANGE UP (ref 6–8.3)
PROT UR-MCNC: NEGATIVE MG/DL — SIGNIFICANT CHANGE UP
RBC # BLD: 4.77 M/UL — SIGNIFICANT CHANGE UP (ref 3.8–5.2)
RBC # FLD: 13.7 % — SIGNIFICANT CHANGE UP (ref 10.3–14.5)
RH IG SCN BLD-IMP: POSITIVE — SIGNIFICANT CHANGE UP
SODIUM SERPL-SCNC: 136 MMOL/L — SIGNIFICANT CHANGE UP (ref 135–145)
SP GR SPEC: 1.01 — SIGNIFICANT CHANGE UP (ref 1–1.03)
UROBILINOGEN FLD QL: 0.2 MG/DL — SIGNIFICANT CHANGE UP (ref 0.2–1)
WBC # BLD: 11.56 K/UL — HIGH (ref 3.8–10.5)
WBC # FLD AUTO: 11.56 K/UL — HIGH (ref 3.8–10.5)

## 2025-07-19 PROCEDURE — 85025 COMPLETE CBC W/AUTO DIFF WBC: CPT

## 2025-07-19 PROCEDURE — 86901 BLOOD TYPING SEROLOGIC RH(D): CPT

## 2025-07-19 PROCEDURE — 80053 COMPREHEN METABOLIC PANEL: CPT

## 2025-07-19 PROCEDURE — 76817 TRANSVAGINAL US OBSTETRIC: CPT | Mod: 26

## 2025-07-19 PROCEDURE — 99284 EMERGENCY DEPT VISIT MOD MDM: CPT | Mod: 25

## 2025-07-19 PROCEDURE — 81003 URINALYSIS AUTO W/O SCOPE: CPT

## 2025-07-19 PROCEDURE — 86850 RBC ANTIBODY SCREEN: CPT

## 2025-07-19 PROCEDURE — 84702 CHORIONIC GONADOTROPIN TEST: CPT

## 2025-07-19 PROCEDURE — 76817 TRANSVAGINAL US OBSTETRIC: CPT

## 2025-07-19 PROCEDURE — 86900 BLOOD TYPING SEROLOGIC ABO: CPT
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
## 2025-07-19 NOTE — ED PROVIDER NOTE - PATIENT PORTAL LINK FT
You can access the FollowMyHealth Patient Portal offered by [facility] by registering at the following website: [email or web address]. By joining [facility]’s FollowMyHealth portal, you will also be able to view your health information using other applications (apps) compatible with our system.

## 2025-07-19 NOTE — ED PROVIDER NOTE - ATTENDING CONTRIBUTION TO CARE
Grant Hamilton DO: I have personally performed a face to face medical and diagnostic evaluation of the patient. I have discussed with and reviewed the Resident's and/or ACP's and/or Medical/PA/NP student's note and agree with the History, ROS, Physical Exam and MDM unless otherwise indicated. A brief summary of my personal evaluation and impression can be found below.      Grant Hamilton DO: I have personally performed a face to face medical and diagnostic evaluation of the patient. I have discussed with and reviewed the Resident's and/or ACP's and/or Medical/PA/NP student's note and agree with the History, ROS, Physical Exam and MDM unless otherwise indicated. A brief summary of my personal evaluation and impression can be found below.     HPI above    CONSTITUTIONAL: NAD  SKIN: Warm dry, normal skin turgor  HEAD: NCAT  EYES: EOMI, PERRLA, no scleral icterus, conjunctiva pink  NECK: Supple; non tender. Full ROM.  CARD: RRR  RESP: No respiratory distress  ABD: non-distended, no abdominal tenderness  MSK: Full ROM, no leg swelling  PSYCH: Cooperative, appropriate.     Patient has minimal abdominal pain at this time we will get transvaginal ultrasound given early pregnancy possible difficulty in finding fetal heart rate because of her early age, patient has no abdominal tenderness concerning for intra-abdominal pathology no peritonitis no concern for intra-abdominal bleed will do transvaginal ultrasound to assess for fetal cardiac activity and patient will likely require a patient serial hCGs/ultrasounds, no other traumatic injury suspect this time, anticipate patient discharge if patient not having any ongoing hemorrhage and is otherwise hemodynamically stable.
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
## 2025-07-22 NOTE — ASU DISCHARGE PLAN (ADULT/PEDIATRIC) - B. DRINK ALCOHOL, BEER, OR WINE
High Dose Vitamin A Pregnancy And Lactation Text: High dose vitamin A therapy is contraindicated during pregnancy and breast feeding. Doxycycline Counseling:  Patient counseled regarding possible photosensitivity and increased risk for sunburn.  Patient instructed to avoid sunlight, if possible.  When exposed to sunlight, patients should wear protective clothing, sunglasses, and sunscreen.  The patient was instructed to call the office immediately if the following severe adverse effects occur:  hearing changes, easy bruising/bleeding, severe headache, or vision changes.  The patient verbalized understanding of the proper use and possible adverse effects of doxycycline.  All of the patient's questions and concerns were addressed. Winlevi Counseling:  I discussed with the patient the risks of topical clascoterone including but not limited to erythema, scaling, itching, and stinging. Patient voiced their understanding. Benzoyl Peroxide Pregnancy And Lactation Text: This medication is Pregnancy Category C. It is unknown if benzoyl peroxide is excreted in breast milk. Tetracycline Counseling: Patient counseled regarding possible photosensitivity and increased risk for sunburn.  Patient instructed to avoid sunlight, if possible.  When exposed to sunlight, patients should wear protective clothing, sunglasses, and sunscreen.  The patient was instructed to call the office immediately if the following severe adverse effects occur:  hearing changes, easy bruising/bleeding, severe headache, or vision changes.  The patient verbalized understanding of the proper use and possible adverse effects of tetracycline.  All of the patient's questions and concerns were addressed. Patient understands to avoid pregnancy while on therapy due to potential birth defects. Topical Sulfur Applications Counseling: Topical Sulfur Counseling: Patient counseled that this medication may cause skin irritation or allergic reactions.  In the event of skin irritation, the patient was advised to reduce the amount of the drug applied or use it less frequently.   The patient verbalized understanding of the proper use and possible adverse effects of topical sulfur application.  All of the patient's questions and concerns were addressed. Topical Retinoid Pregnancy And Lactation Text: This medication is Pregnancy Category C. It is unknown if this medication is excreted in breast milk. Minocycline Pregnancy And Lactation Text: This medication is Pregnancy Category D and not consider safe during pregnancy. It is also excreted in breast milk. Aklief counseling:  Patient advised to apply a pea-sized amount only at bedtime and wait 30 minutes after washing their face before applying.  If too drying, patient may add a non-comedogenic moisturizer.  The most commonly reported side effects including irritation, redness, scaling, dryness, stinging, burning, itching, and increased risk of sunburn.  The patient verbalized understanding of the proper use and possible adverse effects of retinoids.  All of the patient's questions and concerns were addressed. Azithromycin Pregnancy And Lactation Text: This medication is considered safe during pregnancy and is also secreted in breast milk. Topical Clindamycin Counseling: Patient counseled that this medication may cause skin irritation or allergic reactions.  In the event of skin irritation, the patient was advised to reduce the amount of the drug applied or use it less frequently.   The patient verbalized understanding of the proper use and possible adverse effects of clindamycin.  All of the patient's questions and concerns were addressed. Sarecycline Counseling: Patient advised regarding possible photosensitivity and discoloration of the teeth, skin, lips, tongue and gums.  Patient instructed to avoid sunlight, if possible.  When exposed to sunlight, patients should wear protective clothing, sunglasses, and sunscreen.  The patient was instructed to call the office immediately if the following severe adverse effects occur:  hearing changes, easy bruising/bleeding, severe headache, or vision changes.  The patient verbalized understanding of the proper use and possible adverse effects of sarecycline.  All of the patient's questions and concerns were addressed. Aklief Pregnancy And Lactation Text: It is unknown if this medication is safe to use during pregnancy.  It is unknown if this medication is excreted in breast milk.  Breastfeeding women should use the topical cream on the smallest area of the skin for the shortest time needed while breastfeeding.  Do not apply to nipple and areola. Detail Level: Zone Erythromycin Pregnancy And Lactation Text: This medication is Pregnancy Category B and is considered safe during pregnancy. It is also excreted in breast milk. Tazorac Counseling:  Patient advised that medication is irritating and drying.  Patient may need to apply sparingly and wash off after an hour before eventually leaving it on overnight.  The patient verbalized understanding of the proper use and possible adverse effects of tazorac.  All of the patient's questions and concerns were addressed. Dapsone Counseling: I discussed with the patient the risks of dapsone including but not limited to hemolytic anemia, agranulocytosis, rashes, methemoglobinemia, kidney failure, peripheral neuropathy, headaches, GI upset, and liver toxicity.  Patients who start dapsone require monitoring including baseline LFTs and weekly CBCs for the first month, then every month thereafter.  The patient verbalized understanding of the proper use and possible adverse effects of dapsone.  All of the patient's questions and concerns were addressed. Isotretinoin Pregnancy And Lactation Text: This medication is Pregnancy Category X and is considered extremely dangerous during pregnancy. It is unknown if it is excreted in breast milk. Spironolactone Counseling: Patient advised regarding risks of diarrhea, abdominal pain, hyperkalemia, birth defects (for female patients), liver toxicity and renal toxicity. The patient may need blood work to monitor liver and kidney function and potassium levels while on therapy. The patient verbalized understanding of the proper use and possible adverse effects of spironolactone.  All of the patient's questions and concerns were addressed. Azelaic Acid Pregnancy And Lactation Text: This medication is considered safe during pregnancy and breast feeding. Statement Selected Birth Control Pills Counseling: Birth Control Pill Counseling: I discussed with the patient the potential side effects of OCPs including but not limited to increased risk of stroke, heart attack, thrombophlebitis, deep venous thrombosis, hepatic adenomas, breast changes, GI upset, headaches, and depression.  The patient verbalized understanding of the proper use and possible adverse effects of OCPs. All of the patient's questions and concerns were addressed. Topical Retinoid counseling:  Patient advised to apply a pea-sized amount only at bedtime and wait 30 minutes after washing their face before applying.  If too drying, patient may add a non-comedogenic moisturizer. The patient verbalized understanding of the proper use and possible adverse effects of retinoids.  All of the patient's questions and concerns were addressed. Use Enhanced Medication Counseling?: No Azithromycin Counseling:  I discussed with the patient the risks of azithromycin including but not limited to GI upset, allergic reaction, drug rash, diarrhea, and yeast infections. Dapsone Pregnancy And Lactation Text: This medication is Pregnancy Category C and is not considered safe during pregnancy or breast feeding. High Dose Vitamin A Counseling: Side effects reviewed, pt to contact office should one occur. Topical Sulfur Applications Pregnancy And Lactation Text: This medication is Pregnancy Category C and has an unknown safety profile during pregnancy. It is unknown if this topical medication is excreted in breast milk. Spironolactone Pregnancy And Lactation Text: This medication can cause feminization of the male fetus and should be avoided during pregnancy. The active metabolite is also found in breast milk. Bactrim Counseling:  I discussed with the patient the risks of sulfa antibiotics including but not limited to GI upset, allergic reaction, drug rash, diarrhea, dizziness, photosensitivity, and yeast infections.  Rarely, more serious reactions can occur including but not limited to aplastic anemia, agranulocytosis, methemoglobinemia, blood dyscrasias, liver or kidney failure, lung infiltrates or desquamative/blistering drug rashes. Doxycycline Pregnancy And Lactation Text: This medication is Pregnancy Category D and not consider safe during pregnancy. It is also excreted in breast milk but is considered safe for shorter treatment courses. Minocycline Counseling: Patient advised regarding possible photosensitivity and discoloration of the teeth, skin, lips, tongue and gums.  Patient instructed to avoid sunlight, if possible.  When exposed to sunlight, patients should wear protective clothing, sunglasses, and sunscreen.  The patient was instructed to call the office immediately if the following severe adverse effects occur:  hearing changes, easy bruising/bleeding, severe headache, or vision changes.  The patient verbalized understanding of the proper use and possible adverse effects of minocycline.  All of the patient's questions and concerns were addressed. Winlevi Pregnancy And Lactation Text: This medication is considered safe during pregnancy and breastfeeding. Include Pregnancy/Lactation Warning?: Add Automatically Based on Childbearing Potential and Patient Age Tazorac Pregnancy And Lactation Text: This medication is not safe during pregnancy. It is unknown if this medication is excreted in breast milk. Birth Control Pills Pregnancy And Lactation Text: This medication should be avoided if pregnant and for the first 30 days post-partum. Azelaic Acid Counseling: Patient counseled that medicine may cause skin irritation and to avoid applying near the eyes.  In the event of skin irritation, the patient was advised to reduce the amount of the drug applied or use it less frequently.   The patient verbalized understanding of the proper use and possible adverse effects of azelaic acid.  All of the patient's questions and concerns were addressed. Isotretinoin Counseling: Patient should get monthly blood tests, not donate blood, not drive at night if vision affected, not share medication, and not undergo elective surgery for 6 months after tx completed. Side effects reviewed, pt to contact office should one occur. Bactrim Pregnancy And Lactation Text: This medication is Pregnancy Category D and is known to cause fetal risk.  It is also excreted in breast milk. Erythromycin Counseling:  I discussed with the patient the risks of erythromycin including but not limited to GI upset, allergic reaction, drug rash, diarrhea, increase in liver enzymes, and yeast infections. Topical Clindamycin Pregnancy And Lactation Text: This medication is Pregnancy Category B and is considered safe during pregnancy. It is unknown if it is excreted in breast milk. Benzoyl Peroxide Counseling: Patient counseled that medicine may cause skin irritation and bleach clothing.  In the event of skin irritation, the patient was advised to reduce the amount of the drug applied or use it less frequently.   The patient verbalized understanding of the proper use and possible adverse effects of benzoyl peroxide.  All of the patient's questions and concerns were addressed.

## (undated) DEVICE — DRAPE CHEST BREAST 106" X 122"

## (undated) DEVICE — SUT SURGIPRO II 2-0 30" GS-21

## (undated) DEVICE — DRSG CURITY GAUZE SPONGE 4 X 4" 12-PLY

## (undated) DEVICE — PACK MINOR NO DRAPE

## (undated) DEVICE — SUT SOFSILK 2-0 18" C-15

## (undated) DEVICE — SUT MERSILENE 0 30" CT-1

## (undated) DEVICE — GLV 7.5 PROTEXIS (WHITE)

## (undated) DEVICE — SUT TICRON 1 30" GS-21

## (undated) DEVICE — ABDOMINAL BINDER MED/LG 9" X 36"-64"

## (undated) DEVICE — MARKING PEN W RULER

## (undated) DEVICE — ABDOMINAL BINDER MED/LG 12" X 45"-62"

## (undated) DEVICE — WARMING BLANKET UPPER ADULT

## (undated) DEVICE — VENODYNE/SCD SLEEVE CALF MEDIUM

## (undated) DEVICE — SUT POLYSORB 2-0 30" GS-21 UNDYED

## (undated) DEVICE — Device

## (undated) DEVICE — SYR LUER LOK 20CC

## (undated) DEVICE — DRAPE TOWEL BLUE 17" X 24"

## (undated) DEVICE — STAPLER SKIN VISI-STAT 35 WIDE

## (undated) DEVICE — NDL HYPO SAFE 22G X 1.5" (BLACK)

## (undated) DEVICE — PREP BETADINE KIT

## (undated) DEVICE — DRSG XEROFORM 5 X 9"

## (undated) DEVICE — DRSG STERISTRIPS 0.5 X 4"

## (undated) DEVICE — DRSG DERMABOND PRINEO 60CM

## (undated) DEVICE — LIGASURE IMPACT

## (undated) DEVICE — SUT VLOC 180 3-0 18" P-12 UNDYED

## (undated) DEVICE — SUT MONOSOF 2-0 18" C-15

## (undated) DEVICE — SUT VLOC 180 3-0 6" P-12 UNDYED

## (undated) DEVICE — SUT MONOCRYL 2-0 27" UR-6

## (undated) DEVICE — DRAPE 3/4 SHEET 52X76"

## (undated) DEVICE — SOL IRR POUR NS 0.9% 1000ML

## (undated) DEVICE — DRSG TOPIFOAM

## (undated) DEVICE — PACK MAJOR ABDOMINAL WITH LAP

## (undated) DEVICE — GLV 6.5 PROTEXIS (WHITE)

## (undated) DEVICE — DRAIN RESERVOIR FOR JACKSON PRATT 100CC CARDINAL

## (undated) DEVICE — VENODYNE/SCD SLEEVE CALF LARGE

## (undated) DEVICE — DRSG COMBINE 5X9"

## (undated) DEVICE — TUBING MICROAIRE ASPIRATION SET 12FT

## (undated) DEVICE — TUBING INFILTRATION

## (undated) DEVICE — GLV 8 PROTEXIS (BLUE)

## (undated) DEVICE — GOWN TRIMAX LG

## (undated) DEVICE — SUT MONOSOF 3-0 18" P-12

## (undated) DEVICE — SYR LUER LOK 10CC

## (undated) DEVICE — ELCTR BOVIE PENCIL SMOKE EVACUATION

## (undated) DEVICE — SOL IRR POUR H2O 1000ML

## (undated) DEVICE — SUT POLYSORB 3-0 30" V-20 UNDYED

## (undated) DEVICE — FOLEY TRAY 16FR LF URINE METER SURESTEP

## (undated) DEVICE — DRAPE 3/4 SHEET W REINFORCEMENT 56X77"